# Patient Record
Sex: FEMALE | Race: WHITE | URBAN - METROPOLITAN AREA
[De-identification: names, ages, dates, MRNs, and addresses within clinical notes are randomized per-mention and may not be internally consistent; named-entity substitution may affect disease eponyms.]

---

## 2017-12-04 ENCOUNTER — DOCTOR'S OFFICE (OUTPATIENT)
Dept: URBAN - METROPOLITAN AREA CLINIC 136 | Facility: CLINIC | Age: 67
Setting detail: OPHTHALMOLOGY
End: 2017-12-04

## 2017-12-04 DIAGNOSIS — H52.4: ICD-10-CM

## 2017-12-04 DIAGNOSIS — H52.223: ICD-10-CM

## 2017-12-04 DIAGNOSIS — H52.03: ICD-10-CM

## 2017-12-04 PROCEDURE — SELFPAYVIS VISION VISIT SELF PAY: Performed by: OPTOMETRIST

## 2017-12-04 ASSESSMENT — REFRACTION_AUTOREFRACTION
OD_SPHERE: +0.50
OS_SPHERE: +0.50
OS_CYLINDER: -1.00
OD_CYLINDER: -0.50
OD_AXIS: 015
OS_AXIS: 163

## 2017-12-04 ASSESSMENT — REFRACTION_CURRENTRX
OD_OVR_VA: 20/
OD_OVR_VA: 20/
OS_OVR_VA: 20/
OD_OVR_VA: 20/

## 2017-12-04 ASSESSMENT — REFRACTION_MANIFEST
OS_VA2: 20/
OS_VA2: 20/
OD_VA1: 20/
OS_VA3: 20/
OD_VA2: 20/
OU_VA: 20/
OD_VA1: 20/
OS_VA1: 20/
OS_VA1: 20/
OD_VA3: 20/
OD_VA2: 20/
OU_VA: 20/
OS_VA3: 20/
OD_VA3: 20/

## 2017-12-04 ASSESSMENT — VISUAL ACUITY
OD_BCVA: 20/60
OS_BCVA: 20/25+3

## 2017-12-04 ASSESSMENT — REFRACTION_OUTSIDERX
OD_VA2: 20/20
OS_VA1: 20/20
OS_CYLINDER: -0.50
OD_AXIS: 015
OD_VA3: 20/
OU_VA: 20/20
OD_SPHERE: +0.50
OD_CYLINDER: -0.50
OD_VA1: 20/20
OD_ADD: +2.50
OS_VA3: 20/
OS_ADD: +2.50
OS_AXIS: 165
OS_SPHERE: +0.50
OS_VA2: 20/20

## 2017-12-04 ASSESSMENT — SPHEQUIV_DERIVED
OS_SPHEQUIV: 0
OD_SPHEQUIV: 0.25

## 2017-12-04 ASSESSMENT — CONFRONTATIONAL VISUAL FIELD TEST (CVF)
OD_FINDINGS: FULL
OS_FINDINGS: FULL

## 2018-01-02 PROBLEM — H25.13 NUCLEAR CATARACT OU: Status: ACTIVE | Noted: 2017-12-04

## 2018-01-02 PROBLEM — H52.03 HYPEROPIA, ASTIGMATISM, PRESBYOPIA OU: Status: ACTIVE | Noted: 2017-12-04

## 2018-01-02 PROBLEM — H52.4 HYPEROPIA, ASTIGMATISM, PRESBYOPIA OU: Status: ACTIVE | Noted: 2017-12-04

## 2018-01-02 PROBLEM — H52.223 HYPEROPIA, ASTIGMATISM, PRESBYOPIA OU: Status: ACTIVE | Noted: 2017-12-04

## 2022-01-23 ENCOUNTER — HOSPITAL ENCOUNTER (EMERGENCY)
Facility: HOSPITAL | Age: 72
DRG: 897 | End: 2022-01-24
Attending: EMERGENCY MEDICINE
Payer: MEDICARE

## 2022-01-23 DIAGNOSIS — F10.10 ALCOHOL ABUSE: Primary | ICD-10-CM

## 2022-01-23 LAB
AMPHETAMINES SERPL QL SCN: NEGATIVE
ANION GAP SERPL CALCULATED.3IONS-SCNC: 12 MMOL/L (ref 4–13)
BARBITURATES UR QL: NEGATIVE
BASOPHILS # BLD AUTO: 0.05 THOUSANDS/ΜL (ref 0–0.1)
BASOPHILS NFR BLD AUTO: 1 % (ref 0–1)
BENZODIAZ UR QL: NEGATIVE
BUN SERPL-MCNC: 7 MG/DL (ref 5–25)
CALCIUM SERPL-MCNC: 9.5 MG/DL (ref 8.3–10.1)
CHLORIDE SERPL-SCNC: 101 MMOL/L (ref 100–108)
CO2 SERPL-SCNC: 23 MMOL/L (ref 21–32)
COCAINE UR QL: NEGATIVE
CREAT SERPL-MCNC: 0.98 MG/DL (ref 0.6–1.3)
EOSINOPHIL # BLD AUTO: 0.1 THOUSAND/ΜL (ref 0–0.61)
EOSINOPHIL NFR BLD AUTO: 1 % (ref 0–6)
ERYTHROCYTE [DISTWIDTH] IN BLOOD BY AUTOMATED COUNT: 14 % (ref 11.6–15.1)
ETHANOL EXG-MCNC: 0.11 MG/DL
FLUAV RNA RESP QL NAA+PROBE: NEGATIVE
FLUBV RNA RESP QL NAA+PROBE: NEGATIVE
GFR SERPL CREATININE-BSD FRML MDRD: 59 ML/MIN/1.73SQ M
GLUCOSE SERPL-MCNC: 93 MG/DL (ref 65–140)
HCT VFR BLD AUTO: 41.3 % (ref 34.8–46.1)
HGB BLD-MCNC: 14.3 G/DL (ref 11.5–15.4)
IMM GRANULOCYTES # BLD AUTO: 0.03 THOUSAND/UL (ref 0–0.2)
IMM GRANULOCYTES NFR BLD AUTO: 0 % (ref 0–2)
LYMPHOCYTES # BLD AUTO: 2.77 THOUSANDS/ΜL (ref 0.6–4.47)
LYMPHOCYTES NFR BLD AUTO: 29 % (ref 14–44)
MCH RBC QN AUTO: 38.3 PG (ref 26.8–34.3)
MCHC RBC AUTO-ENTMCNC: 34.6 G/DL (ref 31.4–37.4)
MCV RBC AUTO: 111 FL (ref 82–98)
METHADONE UR QL: NEGATIVE
MONOCYTES # BLD AUTO: 0.66 THOUSAND/ΜL (ref 0.17–1.22)
MONOCYTES NFR BLD AUTO: 7 % (ref 4–12)
NEUTROPHILS # BLD AUTO: 6.09 THOUSANDS/ΜL (ref 1.85–7.62)
NEUTS SEG NFR BLD AUTO: 62 % (ref 43–75)
NRBC BLD AUTO-RTO: 0 /100 WBCS
OPIATES UR QL SCN: NEGATIVE
OXYCODONE+OXYMORPHONE UR QL SCN: NEGATIVE
PCP UR QL: NEGATIVE
PLATELET # BLD AUTO: 214 THOUSANDS/UL (ref 149–390)
PMV BLD AUTO: 9.5 FL (ref 8.9–12.7)
POTASSIUM SERPL-SCNC: 3.6 MMOL/L (ref 3.5–5.3)
RBC # BLD AUTO: 3.73 MILLION/UL (ref 3.81–5.12)
RSV RNA RESP QL NAA+PROBE: NEGATIVE
SARS-COV-2 RNA RESP QL NAA+PROBE: NEGATIVE
SODIUM SERPL-SCNC: 136 MMOL/L (ref 136–145)
THC UR QL: NEGATIVE
WBC # BLD AUTO: 9.7 THOUSAND/UL (ref 4.31–10.16)

## 2022-01-23 PROCEDURE — 82075 ASSAY OF BREATH ETHANOL: CPT | Performed by: EMERGENCY MEDICINE

## 2022-01-23 PROCEDURE — 80048 BASIC METABOLIC PNL TOTAL CA: CPT | Performed by: EMERGENCY MEDICINE

## 2022-01-23 PROCEDURE — 36415 COLL VENOUS BLD VENIPUNCTURE: CPT | Performed by: EMERGENCY MEDICINE

## 2022-01-23 PROCEDURE — 80307 DRUG TEST PRSMV CHEM ANLYZR: CPT | Performed by: EMERGENCY MEDICINE

## 2022-01-23 PROCEDURE — 93005 ELECTROCARDIOGRAM TRACING: CPT

## 2022-01-23 PROCEDURE — 85025 COMPLETE CBC W/AUTO DIFF WBC: CPT | Performed by: EMERGENCY MEDICINE

## 2022-01-23 PROCEDURE — 99284 EMERGENCY DEPT VISIT MOD MDM: CPT

## 2022-01-23 PROCEDURE — 0241U HB NFCT DS VIR RESP RNA 4 TRGT: CPT | Performed by: EMERGENCY MEDICINE

## 2022-01-23 PROCEDURE — 99285 EMERGENCY DEPT VISIT HI MDM: CPT | Performed by: EMERGENCY MEDICINE

## 2022-01-23 NOTE — ED PROVIDER NOTES
History  Chief Complaint   Patient presents with    Detox Evaluation     Pt brought in by EMS after family intervention  Pt reports drinking about a half a box of wine daily  Pt reports being a drinker for a very long time over last 2 years has gotten progressively worse  Pt also reports over last 3 weeks no longer taking supplements and showering  Pt reports she feels like she needs help  No SI or HI     Pt is a 71yo F who presents for alcohol detox  Pt states that she has been drinking 1 5L of wine daily for the past 3 years  She states she does drink every day and her last drink was earlier today  Pt denies any history of alcohol withdrawal seizures but does states she gets symptoms of withdrawal, primarily in the AM  Pt states she often has nausea and tremors in the morning that she mitigates with alcohol  Pt states her family had an intervention with her today and she is now here to get help  Pt reporting that she feels her alcohol use has affected her mental health and that she is no longer able to manage her alcohol at home  Pt does smoke 1 pack per day  Pt denies any other substance use  Pt denies any daily medications  Pt reports that she previously took daily supplements but has not taken them for several years  None       History reviewed  No pertinent past medical history  History reviewed  No pertinent surgical history  History reviewed  No pertinent family history  I have reviewed and agree with the history as documented  E-Cigarette/Vaping    E-Cigarette Use Never User      E-Cigarette/Vaping Substances     Social History     Tobacco Use    Smoking status: Current Every Day Smoker     Packs/day: 1 00    Smokeless tobacco: Never Used   Vaping Use    Vaping Use: Never used   Substance Use Topics    Alcohol use: Yes     Comment: half a box of wine daily    Drug use: Never        Review of Systems   12-point ROS negative      Physical Exam  ED Triage Vitals [01/23/22 1856] Temperature Pulse Respirations Blood Pressure SpO2   97 8 °F (36 6 °C) 103 18 106/66 97 %      Temp Source Heart Rate Source Patient Position - Orthostatic VS BP Location FiO2 (%)   Oral Monitor Lying Left arm --      Pain Score       No Pain             Orthostatic Vital Signs  Vitals:    01/24/22 0131 01/24/22 0210 01/24/22 0857 01/24/22 0859   BP: 134/72 119/68 144/76 144/76   Pulse: 96 (!) 111 (!) 119 (!) 119   Patient Position - Orthostatic VS: Lying  Lying        Physical Exam  Vitals reviewed  Constitutional:       General: She is not in acute distress  Appearance: She is well-developed  She is not toxic-appearing or diaphoretic  HENT:      Head: Normocephalic and atraumatic  Right Ear: External ear normal       Left Ear: External ear normal       Nose: Nose normal       Mouth/Throat:      Pharynx: Oropharynx is clear  Eyes:      Extraocular Movements: Extraocular movements intact  Pupils: Pupils are equal, round, and reactive to light  Cardiovascular:      Rate and Rhythm: Regular rhythm  Tachycardia present  Heart sounds: Normal heart sounds  No murmur heard  Pulmonary:      Effort: Pulmonary effort is normal  No respiratory distress  Breath sounds: Normal breath sounds  Abdominal:      General: There is no distension  Palpations: Abdomen is soft  Tenderness: There is no abdominal tenderness  Musculoskeletal:         General: Normal range of motion  Cervical back: Normal range of motion  Right lower leg: No edema  Left lower leg: No edema  Skin:     General: Skin is warm and dry  Capillary Refill: Capillary refill takes less than 2 seconds  Coloration: Skin is not pale  Findings: No erythema or rash  Neurological:      Mental Status: She is alert and oriented to person, place, and time  Psychiatric:         Mood and Affect: Mood is anxious  Speech: Speech normal          Behavior: Behavior is cooperative  Thought Content: Thought content does not include homicidal or suicidal ideation  ED Medications  Medications - No data to display    Diagnostic Studies  Results Reviewed     Procedure Component Value Units Date/Time    Rapid drug screen, urine [[id number]]  (Normal) Collected: 01/23/22 2207    Lab Status: Final result Specimen: Urine, Clean Catch Updated: 01/23/22 2243     Amph/Meth UR Negative     Barbiturate Ur Negative     Benzodiazepine Urine Negative     Cocaine Urine Negative     Methadone Urine Negative     Opiate Urine Negative     PCP Ur Negative     THC Urine Negative     Oxycodone Urine Negative    Narrative:      FOR MEDICAL PURPOSES ONLY  IF CONFIRMATION NEEDED PLEASE CONTACT THE LAB WITHIN 5 DAYS  Drug Screen Cutoff Levels:  AMPHETAMINE/METHAMPHETAMINES  1000 ng/mL  BARBITURATES     200 ng/mL  BENZODIAZEPINES     200 ng/mL  COCAINE      300 ng/mL  METHADONE      300 ng/mL  OPIATES      300 ng/mL  PHENCYCLIDINE     25 ng/mL  THC       50 ng/mL  OXYCODONE      100 ng/mL    COVID/FLU/RSV [[id number]]  (Normal) Collected: 01/23/22 1951    Lab Status: Final result Specimen: Nares from Nasopharyngeal Swab Updated: 01/23/22 2046     SARS-CoV-2 Negative     INFLUENZA A PCR Negative     INFLUENZA B PCR Negative     RSV PCR Negative    Narrative:      FOR PEDIATRIC PATIENTS - copy/paste COVID Guidelines URL to browser: [email or web address]  [employer]    SARS-CoV-2 assay is a Nucleic Acid Amplification assay intended for the  qualitative detection of nucleic acid from SARS-CoV-2 in nasopharyngeal  swabs  Results are for the presumptive identification of SARS-CoV-2 RNA  Positive results are indicative of infection with SARS-CoV-2, the virus  causing COVID-19, but do not rule out bacterial infection or co-infection  with other viruses   Laboratories within the [place] and its  territories are required to report all positive results to the appropriate  public health authorities  Negative results do not preclude SARS-CoV-2  infection and should not be used as the sole basis for treatment or other  patient management decisions  Negative results must be combined with  clinical observations, patient history, and epidemiological information  This test has not been FDA cleared or approved  This test has been authorized by FDA under an Emergency Use Authorization  (EUA)  This test is only authorized for the duration of time the  declaration that circumstances exist justifying the authorization of the  emergency use of an in vitro diagnostic tests for detection of SARS-CoV-2  virus and/or diagnosis of COVID-19 infection under section 564(b)(1) of  the Act, 21 U  S C  505DMY-1(E)(2), unless the authorization is terminated  or revoked sooner  The test has been validated but independent review by FDA  and CLIA is pending  Test performed using [employer] GeneXpert: This RT-PCR assay targets N2,  a region unique to SARS-CoV-2  A conserved region in the E-gene was chosen  for pan-Sarbecovirus detection which includes SARS-CoV-2      Basic metabolic panel [[id number]] Collected: 01/23/22 1951    Lab Status: Final result Specimen: Blood from Arm, Left Updated: 01/23/22 2015     Sodium 136 mmol/L      Potassium 3 6 mmol/L      Chloride 101 mmol/L      CO2 23 mmol/L      ANION GAP 12 mmol/L      BUN 7 mg/dL      Creatinine 0 98 mg/dL      Glucose 93 mg/dL      Calcium 9 5 mg/dL      eGFR 59 ml/min/1 73sq m     Narrative:      Jami guidelines for Chronic Kidney Disease (CKD):     Stage 1 with normal or high GFR (GFR > 90 mL/min/1 73 square meters)    Stage 2 Mild CKD (GFR = 60-89 mL/min/1 73 square meters)    Stage 3A Moderate CKD (GFR = 45-59 mL/min/1 73 square meters)    Stage 3B Moderate CKD (GFR = 30-44 mL/min/1 73 square meters)    Stage 4 Severe CKD (GFR = 15-29 mL/min/1 73 square meters)    Stage 5 End Stage CKD (GFR <15 mL/min/1 73 square meters)  Note: GFR calculation is accurate only with a steady state creatinine    CBC and differential [[id number]]  (Abnormal) Collected: 01/23/22 1951    Lab Status: Final result Specimen: Blood from Arm, Left Updated: 01/23/22 2002     WBC 9 70 Thousand/uL      RBC 3 73 Million/uL      Hemoglobin 14 3 g/dL      Hematocrit 41 3 %       fL      MCH 38 3 pg      MCHC 34 6 g/dL      RDW 14 0 %      MPV 9 5 fL      Platelets 174 Thousands/uL      nRBC 0 /100 WBCs      Neutrophils Relative 62 %      Immat GRANS % 0 %      Lymphocytes Relative 29 %      Monocytes Relative 7 %      Eosinophils Relative 1 %      Basophils Relative 1 %      Neutrophils Absolute 6 09 Thousands/µL      Immature Grans Absolute 0 03 Thousand/uL      Lymphocytes Absolute 2 77 Thousands/µL      Monocytes Absolute 0 66 Thousand/µL      Eosinophils Absolute 0 10 Thousand/µL      Basophils Absolute 0 05 Thousands/µL     POCT alcohol breath test [[id number]]  (Normal) Resulted: 01/23/22 1951    Lab Status: Final result Updated: 01/23/22 1951     EXTBreath Alcohol 0 107                 No orders to display         Procedures  Procedures      ED Course  ED Course as of 01/24/22 2255   [facility] Press Jan 23, 2022   1930 Unable to accept at [facility] detox center at this time 2/2 staffing  Will plan for HOST referral   may be able to accept in AM if pt still awaiting placement  1951 EXTBreath Alcohol: 0 107   2002 CBC and differential(!)  Reviewed and without actionable derangement  6354 Basic metabolic panel  Reviewed and without actionable derangement  Identification of Seniors at Risk      Most Recent Value   (ISAR) Identification of Seniors at Risk    Before the illness or injury that brought you to the Emergency, did you need someone to help you on a regular basis?  0 Filed at: 01/23/2022 1905   In the last 24 hours, have you needed more help than usual? 0 Filed at: 01/23/2022 7302   Have you been hospitalized for one or more nights during the past 6 months? 0 Filed at: 01/23/2022 1905   In general, do you see well? 0 Filed at: 01/23/2022 1905   In general, do you have serious problems with your memory? 0 Filed at: 01/23/2022 1905   Do you take more than three different medications every day? 0 Filed at: 01/23/2022 1905   ISAR Score 0 Filed at: 01/23/2022 1905                    SBIRT 22yo+      Most Recent Value   SBIRT (23 yo +)    In order to provide better care to our patients, we are screening all of our patients for alcohol and drug use  Would it be okay to ask you these screening questions? Yes Filed at: 01/23/2022 6828   Initial Alcohol Screen: US AUDIT-C     1  How often do you have a drink containing alcohol? 6 Filed at: 01/23/2022 1938   2  How many drinks containing alcohol do you have on a typical day you are drinking? 5 Filed at: 01/23/2022 1938   3b  FEMALE Any Age, or MALE 65+: How often do you have 4 or more drinks on one occassion? 6 Filed at: 01/23/2022 1938   Audit-C Score 17 Filed at: 01/23/2022 1938   Full Alcohol Screen: US AUDIT    4  How often during the last year have you found that you were not able to stop drinking once you had started? 4 Filed at: 01/23/2022 1938   5  How often during past year have you failed to do what was normally expected of you because of drinking? 4 Filed at: 01/23/2022 1938   6  How often in past year have you needed a first drink in the morning to get yourself going after a heavy drinking session? 4 Filed at: 01/23/2022 1938   7  How often in past year have you had feeling of guilt or remorse after drinking? 4 Filed at: 01/23/2022 1938   8  How often in past year have you been unable to remember what happened night before because you had been drinking? 1 Filed at: 01/23/2022 1938   9  Have you or someone else been injured as a result of your drinking? 0 Filed at: 01/23/2022 1938   10   Has a relative, friend, doctor or other health worker been concerned about your drinking and suggested you cut down? 4 Filed at: 01/23/2022 1938   AUDIT Total Score 38 Filed at: 01/23/2022 1938   VLADIMIR: How many times in the past year have you    Used an illegal drug or used a prescription medication for non-medical reasons? Never Filed at: 01/23/2022 1938                [facility]  Number of Diagnoses or Management Options  Alcohol abuse  Diagnosis management comments: Pt is a 73yo F who presents for alcohol detox  Will plan for medical clearance labs and placement  Discussed with [address] [place] [address], however, unable to admit overnight  Will plan for HOST referral and attempt to contact [address] [place] [address] again in AM if pt not yet placed  Pt and  updated on plan and agreeable  Medical clearance labs show ETOH on board but no other significant findings  Still awaiting placement at time of sign out  Pt signed out to oncoming physician with plan for transfer to in detox once placement found  Amount and/or Complexity of Data Reviewed  Clinical lab tests: ordered and reviewed  Discussion of test results with the performing providers: yes        Disposition  Final diagnoses:   Alcohol abuse     Time reflects when diagnosis was documented in both MDM as applicable and the Disposition within this note     Time User Action Codes Description Comment    1/24/2022  7:58 AM Atif President Add [F10 10] Alcohol abuse       ED Disposition     ED Disposition Condition Date/Time Comment    Transfer to Another Facility-In Network  Mon Jan 24, 2022  7:58 AM Hong Fuel should be transferred out to [facility]          MD Documentation      Most Recent Value   Patient Condition The patient has been stabilized such that within reasonable medical probability, no material deterioration of the patient condition or the condition of the unborn child(garth) is likely to result from the transfer   Reason for Transfer Level of Care needed not available at this facility   Benefits of Transfer Continuity of care, Specialized equipment and/or services available at the receiving facility (Include comment)________________________   Risks of Transfer Potential for delay in receiving treatment   Accepting Physician Dr Hilario Duane Name, Nicole Dunne      RN Documentation      Most [facility] Name, [facility]   Report Given to Russell Puri, [address] [place] [address]      Follow-up Information    None         There are no discharge medications for this patient  No discharge procedures on file  PDMP Review     None           ED Provider  Attending physically available and evaluated Kamilajay jay Easton  MARTY managed the patient along with the ED Attending      Electronically Signed by         Sylvie Palomino MD  01/24/22 7643

## 2022-01-24 ENCOUNTER — HOSPITAL ENCOUNTER (INPATIENT)
Facility: HOSPITAL | Age: 72
LOS: 2 days | Discharge: OTHER INSTITUTION | DRG: 897 | End: 2022-01-26
Attending: EMERGENCY MEDICINE | Admitting: EMERGENCY MEDICINE
Payer: MEDICARE

## 2022-01-24 VITALS
DIASTOLIC BLOOD PRESSURE: 76 MMHG | WEIGHT: 139 LBS | OXYGEN SATURATION: 97 % | HEART RATE: 119 BPM | SYSTOLIC BLOOD PRESSURE: 144 MMHG | RESPIRATION RATE: 20 BRPM | TEMPERATURE: 97.8 F

## 2022-01-24 DIAGNOSIS — D75.89 MACROCYTOSIS WITHOUT ANEMIA: ICD-10-CM

## 2022-01-24 DIAGNOSIS — F10.20 ALCOHOL USE DISORDER, MODERATE, DEPENDENCE (HCC): Primary | ICD-10-CM

## 2022-01-24 PROBLEM — F10.239 ALCOHOL WITHDRAWAL SYNDROME WITH COMPLICATION, WITH UNSPECIFIED COMPLICATION (HCC): Status: ACTIVE | Noted: 2022-01-24

## 2022-01-24 PROBLEM — M17.0 OSTEOARTHRITIS OF BOTH KNEES: Status: ACTIVE | Noted: 2022-01-24

## 2022-01-24 PROBLEM — F10.939 ALCOHOL WITHDRAWAL SYNDROME WITH COMPLICATION, WITH UNSPECIFIED COMPLICATION (HCC): Status: ACTIVE | Noted: 2022-01-24

## 2022-01-24 LAB
ATRIAL RATE: 79 BPM
P AXIS: 72 DEGREES
PR INTERVAL: 210 MS
QRS AXIS: 4 DEGREES
QRSD INTERVAL: 74 MS
QT INTERVAL: 390 MS
QTC INTERVAL: 447 MS
T WAVE AXIS: 76 DEGREES
VENTRICULAR RATE: 79 BPM

## 2022-01-24 PROCEDURE — 1124F ACP DISCUSS-NO DSCNMKR DOCD: CPT | Performed by: PHYSICIAN ASSISTANT

## 2022-01-24 PROCEDURE — 93010 ELECTROCARDIOGRAM REPORT: CPT | Performed by: INTERNAL MEDICINE

## 2022-01-24 PROCEDURE — 99222 1ST HOSP IP/OBS MODERATE 55: CPT

## 2022-01-24 PROCEDURE — HZ2ZZZZ DETOXIFICATION SERVICES FOR SUBSTANCE ABUSE TREATMENT: ICD-10-PCS | Performed by: EMERGENCY MEDICINE

## 2022-01-24 RX ORDER — LANOLIN ALCOHOL/MO/W.PET/CERES
100 CREAM (GRAM) TOPICAL DAILY
Status: DISCONTINUED | OUTPATIENT
Start: 2022-01-25 | End: 2022-01-26 | Stop reason: HOSPADM

## 2022-01-24 RX ORDER — ACETAMINOPHEN 325 MG/1
650 TABLET ORAL EVERY 6 HOURS PRN
Status: DISCONTINUED | OUTPATIENT
Start: 2022-01-24 | End: 2022-01-26 | Stop reason: HOSPADM

## 2022-01-24 RX ORDER — DOCUSATE SODIUM 100 MG/1
100 CAPSULE, LIQUID FILLED ORAL 2 TIMES DAILY PRN
Status: DISCONTINUED | OUTPATIENT
Start: 2022-01-24 | End: 2022-01-26 | Stop reason: HOSPADM

## 2022-01-24 RX ORDER — ONDANSETRON 2 MG/ML
4 INJECTION INTRAMUSCULAR; INTRAVENOUS EVERY 6 HOURS PRN
Status: DISCONTINUED | OUTPATIENT
Start: 2022-01-24 | End: 2022-01-26 | Stop reason: HOSPADM

## 2022-01-24 RX ORDER — TRAZODONE HYDROCHLORIDE 50 MG/1
50 TABLET ORAL
Status: DISCONTINUED | OUTPATIENT
Start: 2022-01-24 | End: 2022-01-24

## 2022-01-24 RX ORDER — LANOLIN ALCOHOL/MO/W.PET/CERES
100 CREAM (GRAM) TOPICAL DAILY
Status: DISCONTINUED | OUTPATIENT
Start: 2022-01-24 | End: 2022-01-24

## 2022-01-24 RX ORDER — SODIUM CHLORIDE 9 MG/ML
100 INJECTION, SOLUTION INTRAVENOUS CONTINUOUS
Status: DISCONTINUED | OUTPATIENT
Start: 2022-01-24 | End: 2022-01-24

## 2022-01-24 RX ORDER — FOLIC ACID 1 MG/1
1 TABLET ORAL DAILY
Status: DISCONTINUED | OUTPATIENT
Start: 2022-01-24 | End: 2022-01-24

## 2022-01-24 RX ORDER — NICOTINE 21 MG/24HR
21 PATCH, TRANSDERMAL 24 HOURS TRANSDERMAL DAILY
Status: DISCONTINUED | OUTPATIENT
Start: 2022-01-24 | End: 2022-01-26 | Stop reason: HOSPADM

## 2022-01-24 RX ORDER — LANOLIN ALCOHOL/MO/W.PET/CERES
3 CREAM (GRAM) TOPICAL
Status: DISCONTINUED | OUTPATIENT
Start: 2022-01-24 | End: 2022-01-26 | Stop reason: HOSPADM

## 2022-01-24 RX ORDER — FOLIC ACID 1 MG/1
1 TABLET ORAL DAILY
Status: DISCONTINUED | OUTPATIENT
Start: 2022-01-25 | End: 2022-01-26 | Stop reason: HOSPADM

## 2022-01-24 RX ADMIN — ENOXAPARIN SODIUM 40 MG: 40 INJECTION SUBCUTANEOUS at 11:06

## 2022-01-24 RX ADMIN — MULTIPLE VITAMINS W/ MINERALS TAB 1 TABLET: TAB ORAL at 11:06

## 2022-01-24 RX ADMIN — FOLIC ACID: 5 INJECTION, SOLUTION INTRAMUSCULAR; INTRAVENOUS; SUBCUTANEOUS at 12:18

## 2022-01-24 RX ADMIN — SODIUM CHLORIDE 100 ML/HR: 0.9 INJECTION, SOLUTION INTRAVENOUS at 11:06

## 2022-01-24 NOTE — PROGRESS NOTES
01/24/22 1312   Referral Data   Referral Name self   Referral Reason Drug/Alcohol [address] [facility]   Readmission Root Cause   30 Day Readmission No   Patient Information   Mental Status Alert   Primary Caregiver Self   Support System Immediate family   Legal Information   Legal Issues pt denies   Health Care Proxy Appointed No   Activities of Daily Living Prior to Admission   Functional Status Independent   Assistive Device No device needed   Living Arrangement House   Ambulation Independent   Access to Firearms   Access to Firearms Yes   Describe Access to Weapons pt reports her and her  own firearms   Is there someone that can secure the firearms? Yes -  will be notified and asked to [address] SSI/SSD   Means of Transportation   Means of Transport to Appts: Drives Self     Pt is a 73year old  female who was admitted to the detox for alcohol withdrawal  Pt had presented at the [facility] ED via ambulance after her family conducted an intervention regarding her drinking and pt was agreeable to detox  Pt's name, date of birth, home address, and telephone number were verified  Pt was informed of case management role and the purpose of the completion of intake with case management  Pt presented as cooperative and answered all questions  Pt reports she had been drinking a box to 1/2 box of wine daily for the last two years  Pt reports her last drink was 1/23/2022 of 20 oz of wine and her first use of alcohol was 20  Pt reports nicotine use of 1 PPD of cigarettes  Pt reports of period of abstinence of 4 months at one time achieved by dieting and not wanting to increase her calories  Pt denied any previous substance use treatment or detox  Pt denied any current withdrawal symptoms but reported common were tremor, dry heaves, and abdominal pain   Pt reports a family history of substance abuse- siblings  AUDIT: 38  PAWSS: 4  Ethanol lvl: <10  UDS: negative for all    Pt reports a self diagnosis of anxiety and depression but denied any formal treatment  Pt denied any SI or HI, denied AH/VH  Pt denied any history of abuse  Pt reports access to legally owned firearms in the home and pt's  Alejandra Mcmullen was contacted and asked to secure these weapons  Pt denied any family history of mental health needs  Pt denied any current medical conditions  Pt denied having a PCP at this time  Pt has current medical insurance of medicare and an AARP supplemental plan  Pt has preferred pharmacy of [facility]  Pt signed her admission and discharge IMM  Pt denied any current legal issues  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  Pt reports income of [employer]  Pt states she has a car and license  Pt denied any  service  Pt reports she resides with her  in their home  Pt signed an LUIS E for Flash Bhat, , and he was contacted at [phone]  Kathy Cedeno states he is in support of pt entering rehab and states the family has been researching possible rehabs  Cm explained the referral process for HOST and informed Kathy Cedeno that cm would contact Kathy Cedeno with updated information as needed  Pt and Cm completed relapse prevention plan  Pt and Cm signed plan and pt received a copy of this plan  Pt states she is interested in inpatient treatment at this time  Pt presented as motivated and states she is also willing to begin AA programs after inpatient treatment  Pt signed an LUIS E for HOST program due to pt's medicare status and cm made a referral to HOST  Pt presents as motivated for recovery and presents as ready to begin the process  Pt's goals for treatment will be to successfully complete medical withdrawal and to transfer to inpatient rehab  Pt would benefit from developing an AA peer group who will provide pt with education regarding recovery and relapse   Pt presents in the contemplation stage of change

## 2022-01-24 NOTE — EMTALA/ACUTE CARE TRANSFER
[facility] EMERGENCY DEPARTMENT  [address] [place] [address]  Dept: [phone]      KXCELP TRANSFER CONSENT    NAME Brii Berry                                         1950                              MRN [id number]    I have been informed of my rights regarding examination, treatment, and transfer   by Dr Carl Fuller MD    Benefits: Continuity of care,Specialized equipment and/or services available at the receiving facility (Include comment)________________________    Risks: Potential for delay in receiving treatment      Consent for Transfer:  I acknowledge that my medical condition has been evaluated and explained to me by the emergency department physician or other qualified medical person and/or my attending physician, who has recommended that I be transferred to the service of  Accepting Physician: Dr Wilbert Valencia at [facility] Name, [address] : [place]  The above potential benefits of such transfer, the potential risks associated with such transfer, and the probable risks of not being transferred have been explained to me, and I fully understand them  The doctor has explained that, in my case, the benefits of transfer outweigh the risks  I agree to be transferred  I authorize the performance of emergency medical procedures and treatments upon me in both transit and upon arrival at the receiving facility  Additionally, I authorize the release of any and all medical records to the receiving facility and request they be transported with me, if possible  I understand that the safest mode of transportation during a medical emergency is an ambulance and that the Hospital advocates the use of this mode of transport  Risks of traveling to the receiving facility by car, including absence of medical control, life sustaining equipment, such as oxygen, and medical personnel has been explained to me and I fully understand them      ([address])  [ ]  I consent to the stated transfer and to be transported by ambulance/helicopter  [  ]  I consent to the stated transfer, but refuse transportation by ambulance and accept full responsibility for my transportation by car  I understand the risks of non-ambulance transfers and I exonerate the Hospital and its staff from any deterioration in my condition that results from this refusal     X___________________________________________    DATE  22  TIME________  Signature of patient or legally responsible individual signing on patient behalf           RELATIONSHIP TO PATIENT_________________________          Provider Certification    NAME Jenny Jacobo                                         1950                              MRN [id number]    A medical screening exam was performed on the above named patient  Based on the examination:    Condition Necessitating Transfer The encounter diagnosis was Alcohol abuse  Patient Condition: The patient has been stabilized such that within reasonable medical probability, no material deterioration of the patient condition or the condition of the unborn child(garth) is likely to result from the transfer    Reason for Transfer: Level of Care needed not available at this facility    Transfer Requirements: [address]   · Space available and qualified personnel available for treatment as acknowledged by    · Agreed to accept transfer and to provide appropriate medical treatment as acknowledged by       Dr Amy Munguia  · Appropriate medical records of the examination and treatment of the patient are provided at the time of transfer   [facility], [address] _______  · Transfer will be performed by qualified personnel from    and appropriate transfer equipment as required, including the use of necessary and appropriate life support measures      Provider Certification: I have examined the patient and explained the following risks and benefits of being transferred/refusing transfer to the patient/family:         Based on these reasonable risks and benefits to the patient and/or the unborn child(garth), and based upon the information available at the time of the patients examination, I certify that the medical benefits reasonably to be expected from the provision of appropriate medical treatments at another medical facility outweigh the increasing risks, if any, to the individuals medical condition, and in the case of labor to the unborn child, from effecting the transfer      X____________________________________________ DATE 01/24/22        TIME_______      ORIGINAL - SEND TO MEDICAL RECORDS   COPY - SEND WITH PATIENT DURING TRANSFER

## 2022-01-24 NOTE — H&P
HISTORY & PHYSICAL EXAM  DEPARTMENT OF MEDICAL TOXICOLOGY  LEVEL 4 MEDICAL DETOX UNIT  Orlin Marrero 73 y o  female MRN: [id number]  Unit/Bed#: [address] [place] [address] [id number] Encounter: [id number]      Reason for Admission/Principal Problem: Ethanol withdrawal, Ethanol use disorder  Admitting Provider: Vasyl Maynard PA-C  Attending Provider: Ranulfo Romero MD   1/24/2022 10:22 AM        * Alcohol withdrawal syndrome with complication, with unspecified complication [facility])  Assessment & Plan  · Patient reports to drinking 1 5L of wine daily  · Last drank:  1/23/22  · Ethanol level upon arrival for ED: 107  · Initiate SEWS protocol  · Withdrawal symptoms will be treated with phenobarbital   · Will start on IV thiamine, folic acid and oral multivitamin supplementation   · Repeat CMP and CBC in AM     Alcohol use disorder, moderate, dependence ([facility] [address] )  Assessment & Plan  · Patient endorses longstanding history of alcohol abuse  · Patient denies any prior inpatient hospitalizations for detox without history of alcohol withdrawal seizures  · Case Management consulted for aftercare planning  · At this time patient wishes for inpatient rehab placement upon discharge     Macrocytosis without anemia  Assessment & Plan  ·    · Will start IV folic acid and thiamine supplementation at this time  Osteoarthritis of both knees  Assessment & Plan  · Tylenol as needed for pain  · Will order voltaren gel prn              VTE Prophylaxis: Enoxaparin (Lovenox)  / sequential compression device   Code Status: Full code per patient  POLST: POLST is not applicable to this patient  Discussion with family: I personally did not discuss this patient with her family    Anticipated Length of Stay:  Patient will be admitted on an Inpatient basis with an anticipated length of stay of  2  midnights     Justification for Hospital Stay: alcohol withdrawal, alcohol use disorder    For any questions or concerns, please Tiger Text the advanced practitioner in the role of [facility]-DETOX-AP On Call      This patient qualifies for Level IV medically managed intensive inpatient services under the criteria set by the American Society of Addiction Medicine, including dimensions 1-3  The patient is in withdrawal (or is intoxicated with high risk of withdrawal), with severe and unstable medical and/or psychiatric (dual diagnosis) problems, requiring requires 24-hour medical and nursing care and the full resources of a [address] patient to medical detox unit and continue supportive care and stabilization of acute ethanol withdrawal per medical toxicology/detox treatment pathway  Monitor ethanol withdrawal severity via the Severity of Ethanol Withdrawal Scale (SEWS) Q4 hours and then hourly if/when SEWS > 6  Treat withdrawal per pathway and reassess Q30-60 minutes  Mild SEWS Score 1-6  Administer medications* (IV or PO; PO preferred):   If initial SEWS score: diazepam 10mg PO/IV x 1 AND phenobarbital 65 mg PO/IV x 1   If repeat SEWS score 1-6: phenobarbital 65 mg PO/IV q1 hour x 5 doses maximum   Reassessment:    SEWS q1 hour after each dose until SEWS 0 x 2 hours   VS q1 hours (until SEWS 0, then q4 hours)   Notify provider for bedside evaluation if 5-dose maximum is reached, RASS of -3 to -5, or SEWS score escalates to moderate or severe  Moderate SEWS Score 7-12  Administer medications* (IV):   If initial SEWS score: diazepam 10mg IV x 1 AND phenobarbital 260 mg IV x 1   If repeat SEWS score 7-12 or score escalated from mild: phenobarbital 130 mg IV q30 minutes x 5 doses maximum   Reassessment:   SEWS q30 minutes after each dose until SEWS < 7 (then hourly until SEWS 0 x 2 hours)   VS q30 minutes until SEWS < 7 (then hourly until SEWS 0, then q4 hours)   Notify provider for bedside evaluation if 5-dose maximum is reached, RASS of -3 to -5, or SEWS score escalates to severe     Severe SEWS Score ? 13  Administer medications* (IV):   If initial SEWS score: Diazepam 10 mg IV x 1 AND phenobarbital 650 mg IV piggyback x 1 over 15-30 minutes   If repeat SEWS score ? 13 or score escalated from mild or moderate: phenobarbital 130 mg IV q30 minutes x 5 doses maximum   Reassessment:   SEWS q30 minutes after each dose until SEWS < 7 (then hourly until SEWS 0 x 2 hours)    VS q30 minutes until SEWS < 7 (then hourly until SEWS 0, then q4 hours)   Notify provider for bedside evaluation if 5-dose maximum is reached or RASS of -3 to -5   *Hold medications and notify provider if CNS depression, respirations < 10/min, or RASS of -3 to -5  Medications to be administered adjunctively if more than 2 grams of phenobarbital is needed for stabilization of withdrawal; require attending approval     Dexmedetomidine infusion 0 1-1mcg/kg/hr IV infusion, titratable to reduced agitation (Goal: RASS -2)   Ketamine   o Acute agitated delirium: 1-2 mg/kg IV or 4-5 mg/kg IM  o Refractory withdrawal: 0 1-1mg/kg/hr IV infusion, titratable to reduced agitation (Goal: RASS -2)    Further evaluation, screening and treatment:  Evaluate complete metabolic panel, transaminases, INR, and lipase  Assess hepatic ultrasound for any sign of alcoholic liver disease or cirrhosis, and ultimately refer for further hepatic evaluation and care as/if indicated  Additional medications for ethanol associated malnutrition: Thiamine 100 mg IV daily, increase to 500 mg TID for signs/symptoms of Wernicke's Encephalopathy or Wernicke Korsakoff Syndrome   Folic acid 1 mg IV daily   Multivitamin PO daily      Will offer first monthly injection of Naltrexone 380 mg IM, once patient is stabilized, as it has been shown to assist in decreasing cravings for ethanol  Evaluate and treat for coexisting substance use, such as opioids and nicotine   Discuss risk factors for infectious disease, such as history of intravenous drug abuse, and offer hepatitis and HIV screening if indicated  Case management consultation to assist with coordination of subsequent treatment after discharge  Hx and PE limited by: none     HPI: Parth Taylor is a 73y o  year old female with unremarkable past medical history, besides osteoarthritis,  alcohol use disorder, with a history breast cancer status post bilateral mastectomy who presents with acute alcohol withdrawal   Patient reports that her drinking has increased since the beginning of the pandemic  She reports drinking around 1 5 L of wine a daily in order to avoid symptoms of withdrawals every morning  No prior history of inpatient detox hospitalizations or history of withdrawal seizures  At the time of admission patient is interested in inpatient rehab upon discharge from detox unit  Preferred alcoholic beverage(s): wine   Quantity and frequency of alcohol intake: 1 5L daily   Use of any ethanol substitutes (toxic alcohols): no  Date/Time of last alcohol intake: 1/23/22 @ 1:30 pm   Current signs and symptoms of ethanol withdrawal: anxiety and tremor    SEWS Total Score: 0 (1/24/2022 10:33 AM)        Ethanol Withdrawal History  Previous ethanol withdrawal? no  Prior inpatient treatment for ethanol withdrawal? no  Prior outpatient treatment for ethanol withdrawal? no  History of seizures with prior ethanol withdrawal? no  Prior treatment with naltrexone (Vivitrol)? no  Current treatment with naltrexone (Vivitrol)? no  Other current treatment for ethanol use disorder? no  Co-existing substance use? no    Review of PDMP: yes    Social History     Substance and Sexual Activity   Alcohol Use Yes    Comment: half a box of wine daily     Social History     Substance and Sexual Activity   Drug Use Never     Social History     Tobacco Use   Smoking Status Current Every Day Smoker    Packs/day: 1 00   Smokeless Tobacco Never Used       Review of Systems   Constitutional: Negative for chills and fever  Cardiovascular: Negative for chest pain  Gastrointestinal: Negative for abdominal pain, diarrhea, nausea and vomiting  Musculoskeletal: Positive for back pain  Neurological: Positive for tremors  Negative for seizures  Psychiatric/Behavioral: The patient is nervous/anxious  Historical Information   History reviewed  No pertinent past medical history  History reviewed  No pertinent surgical history  History reviewed  No pertinent family history    Social History   Marital Status: /Civil Union   Occupation: Retired   Patient Pre-hospital Living Situation: Lives with , Rosaura Bonilla  Patient Pre-hospital Level of Mobility: Independent  Patient Pre-hospital Diet Restrictions: none     No Known Allergies    Prior to Admission medications    Not on File       Current Facility-Administered Medications   Medication Dose Route Frequency    acetaminophen (TYLENOL) tablet 650 mg  650 mg Oral Q6H PRN    docusate sodium (COLACE) capsule 100 mg  100 mg Oral BID PRN    enoxaparin (LOVENOX) subcutaneous injection 40 mg  40 mg Subcutaneous Daily    folic acid 1 mg, thiamine (VITAMIN B1) 100 mg in sodium chloride 0 9 % 100 mL IV piggyback   Intravenous Daily    multivitamin-minerals (CENTRUM) tablet 1 tablet  1 tablet Oral Daily    nicotine (NICODERM CQ) 21 mg/24 hr TD 24 hr patch 21 mg  21 mg Transdermal Daily    ondansetron (ZOFRAN) injection 4 mg  4 mg Intravenous Q6H PRN    sodium chloride 0 9 % infusion  100 mL/hr Intravenous Continuous    traZODone (DESYREL) tablet 50 mg  50 mg Oral HS PRN       Continuous Infusions:sodium chloride, 100 mL/hr, Last Rate: 100 mL/hr (01/24/22 1106)             Objective     No intake or output data in the 24 hours ending 01/24/22 1153    Invasive Devices:   Peripheral IV 01/24/22 Right Forearm (Active)   Site Assessment WDL 01/24/22 0931       Vitals   Vitals:    01/24/22 1024   BP: 118/72   TempSrc: Temporal   Pulse: 105   Resp: 20   Patient Position - Orthostatic VS: Sitting   Temp: 98 3 °F (36 8 °C)       Physical Exam  Constitutional:       Appearance: She is not ill-appearing  Eyes:      Extraocular Movements: Extraocular movements intact  Pupils: Pupils are equal, round, and reactive to light  Cardiovascular:      Rate and Rhythm: Normal rate and regular rhythm  Heart sounds: No murmur heard  Pulmonary:      Breath sounds: Normal breath sounds  No wheezing  Abdominal:      General: There is no distension  Palpations: Abdomen is soft  Tenderness: There is no abdominal tenderness  Skin:     General: Skin is warm and dry  Coloration: Skin is not jaundiced  Neurological:      Mental Status: She is oriented to person, place, and time  Data:    EKG, Pathology, and Other Studies: I have personally reviewed pertinent reports      EKG: Sinus rhythm with 1st degree A-V block  Otherwise normal ECG  No previous ECGs available  Confirmed by Zay Nugent ([id number]) on 1/24/2022 8:41:42 AM    Lab Results:  CBC ETOH     Lab Results   Component Value Date    WBC 9 70 01/23/2022    RBC 3 73 (L) 01/23/2022    HGB 14 3 01/23/2022    HCT 41 3 01/23/2022     (H) 01/23/2022    MCH 38 3 (H) 01/23/2022    MCHC 34 6 01/23/2022    RDW 14 0 01/23/2022     01/23/2022    MPV 9 5 01/23/2022      No results found for: LACTICACID   CMP UA         Component Value Date/Time    K 3 6 01/23/2022 1951     01/23/2022 1951    CO2 23 01/23/2022 1951    BUN 7 01/23/2022 1951    CREATININE 0 98 01/23/2022 1951         Component Value Date/Time    CALCIUM 9 5 01/23/2022 1951      No results found for: CLARITYU, COLORU, SPECGRAV, PHUR, GLUCOSEU, KETONESU, BLOODU, PROTEIN UA, NITRITE, BILIRUBINUR, UROBILINOGEN, LEUKOCYTESUR, WBCUA, RBCUA, HYALINE, BACTERIA, EPIS     Liver Function Test: ASA     No results found for: TBILI, BILIDIR, ALKPHOS, AST, ALT, TP, ALB   No results found for: SALICYLATE   Troponin APAP     No results found for: TROPONINI   No results found for: ACTMNPHEN   VBG HCG     No results found for: PHVEN, CCR0ERR, PO2VEN, RWP8FXT, BEVEN, S9QGHPXAE, I8GPNZP   No results found for: HCGQUANT   ABG Urine Drug Screen     No results found for: PHART, OHT9EUR, PO2ART, LBY4VBF, BEART, T9MVVASFE, O2HGB, SOURC, MEGHNA, VTAC, ACRATE, INSPIREDAIR, PEEP   Lab Results   Component Value Date    AMPMETHUR Negative 01/23/2022    BARBTUR Negative 01/23/2022    BDZUR Negative 01/23/2022    COCAINEUR Negative 01/23/2022    METHADONEUR Negative 01/23/2022    OPIATEUR Negative 01/23/2022    PCPUR Negative 01/23/2022    THCUR Negative 01/23/2022    OXYCODONEUR Negative 01/23/2022      Lactate INR     No results found for: LACTICACID   No results found for: INR   PTT Protime     No results found for: PTT     No results found for: PROTIME           Imaging Studies: I have personally reviewed pertinent reports  Counseling / Coordination of Care  Total floor / unit time spent today 35 minutes  Greater than 50% of total time was spent with the patient and / or family counseling and / or coordination of care  ** Please Note: This note has been constructed using a voice recognition system   **

## 2022-01-24 NOTE — ED ATTENDING ATTESTATION
1/23/2022  IShannen MD, saw and evaluated the patient  I have discussed the patient with the resident/non-physician practitioner and agree with the resident's/non-physician practitioner's findings, Plan of Care, and MDM as documented in the resident's/non-physician practitioner's note, except where noted  All available labs and Radiology studies were reviewed  I was present for key portions of any procedure(s) performed by the resident/non-physician practitioner and I was immediately available to provide assistance  At this point I agree with the current assessment done in the Emergency Department    I have conducted an independent evaluation of this patient a history and physical is as follows:  Pt states that she has always had a problem with alcohol Pt states that for the past 3 years she has drank a box of wine a day Pt gets physical symptoms when she does not drink but she has never had a seizure pt has no physical co Pt suffers from anxiety and depression saw a therapist for a while no si or hi Pt fearful of shower and being unbalanced in shower at this time PE: alert heart reg lungs clear abd soft nontender ext nad MDM; will check labs discuss with tox for inpatient admission   ED Course         Critical Care Time  Procedures

## 2022-01-24 NOTE — ASSESSMENT & PLAN NOTE
· Patient endorses longstanding history of alcohol abuse  · Patient denies any prior inpatient hospitalizations for detox without history of alcohol withdrawal seizures  · Case Management consulted for aftercare planning  · At this time patient wishes for inpatient rehab placement upon discharge

## 2022-01-24 NOTE — DISCHARGE INSTR - OTHER ORDERS
Drug and Alcohol Resources in [facility]    If you have health insurance, including medical assistance, there should be a phone number on your insurance card that you can call to find out how to access services  The card may say, For [address] or For Drug and Alcohol Services or For Substance Abuse Services call the number provided  [facility]  [facility], [facility], [address]   [phone]  A  is available Monday through Friday from 8:00 am to 5:00 pm to provide you with assistance on accessing services for substance abuse  If you do not have health insurance and are in financial need, this office may also help you get the funding for the services that are necessary  [facility] provides funding to support three Recovery Centers in [facility]  These centers offer a safe, sober environment to those in recovery  A variety of programming including 12-Step Meetings, Wilda Silverio, Life Skills Workshops, etc  is offered at each location  [address]  [phone]  www  cleanslatebangor  org [address]  Irene Castrejon, [address] [facility]  [phone]  evusal-wt-qtkn  [address] [facility], [facility]  [phone]   [address] [place] [address]  [phone]  www  [facility], [address] [place] [address]  [phone]  [facility]  Confidential free help, from public health agencies, to find substance use treatment and information  [phone]    Link for Zoom Codes for Virtual 12 step Meetings  Rashel hoyos uk  aspx  Alcoholics Anonymous  [facility]  [phone]    http://www wen com/  Narcotics Anonymous  [phone]  https://Swarm64/

## 2022-01-24 NOTE — ASSESSMENT & PLAN NOTE
· Patient reports to drinking 1 5L of wine daily  · Last drank:  1/23/22 around 1:30pm   · Ethanol level upon arrival for ED: 107  · Initiate SEWS protocol  · Withdrawal symptoms will be treated with phenobarbital   · Will start on IV thiamine, folic acid and oral multivitamin supplementation   · Repeat CMP and CBC in AM

## 2022-01-24 NOTE — ED NOTES
Physician to refer to Elizabet Frazier dextox unit at [facility]  Patient notified

## 2022-01-25 PROBLEM — R74.8 ELEVATED LIVER ENZYMES: Status: ACTIVE | Noted: 2022-01-25

## 2022-01-25 PROBLEM — F10.239 ALCOHOL WITHDRAWAL SYNDROME WITH COMPLICATION, WITH UNSPECIFIED COMPLICATION (HCC): Status: RESOLVED | Noted: 2022-01-24 | Resolved: 2022-01-25

## 2022-01-25 PROBLEM — F10.939 ALCOHOL WITHDRAWAL SYNDROME WITH COMPLICATION, WITH UNSPECIFIED COMPLICATION (HCC): Status: RESOLVED | Noted: 2022-01-24 | Resolved: 2022-01-25

## 2022-01-25 LAB
ALBUMIN SERPL BCP-MCNC: 3.1 G/DL (ref 3–5.2)
ALP SERPL-CCNC: 107 U/L (ref 43–122)
ALT SERPL W P-5'-P-CCNC: 47 U/L
ANION GAP SERPL CALCULATED.3IONS-SCNC: 6 MMOL/L (ref 5–14)
AST SERPL W P-5'-P-CCNC: 82 U/L (ref 14–36)
BILIRUB SERPL-MCNC: 0.9 MG/DL
BUN SERPL-MCNC: 9 MG/DL (ref 5–25)
CALCIUM ALBUM COR SERPL-MCNC: 9.1 MG/DL (ref 8.3–10.1)
CALCIUM SERPL-MCNC: 8.4 MG/DL (ref 8.4–10.2)
CHLORIDE SERPL-SCNC: 103 MMOL/L (ref 97–108)
CO2 SERPL-SCNC: 25 MMOL/L (ref 22–30)
CREAT SERPL-MCNC: 0.71 MG/DL (ref 0.6–1.2)
ERYTHROCYTE [DISTWIDTH] IN BLOOD BY AUTOMATED COUNT: 14.8 %
GFR SERPL CREATININE-BSD FRML MDRD: 87 ML/MIN/1.73SQ M
GLUCOSE SERPL-MCNC: 90 MG/DL (ref 70–99)
HAV IGM SER QL: NORMAL
HBV CORE IGM SER QL: NORMAL
HBV SURFACE AG SER QL: NORMAL
HCT VFR BLD AUTO: 37.8 % (ref 36–46)
HCV AB SER QL: NORMAL
HGB BLD-MCNC: 13.2 G/DL (ref 12–16)
MAGNESIUM SERPL-MCNC: 2 MG/DL (ref 1.6–2.3)
MCH RBC QN AUTO: 39.3 PG (ref 26–34)
MCHC RBC AUTO-ENTMCNC: 34.8 G/DL (ref 31–36)
MCV RBC AUTO: 113 FL (ref 80–100)
PLATELET # BLD AUTO: 181 THOUSANDS/UL (ref 150–450)
PMV BLD AUTO: 8 FL (ref 8.9–12.7)
POTASSIUM SERPL-SCNC: 3.6 MMOL/L (ref 3.6–5)
PROT SERPL-MCNC: 6.2 G/DL (ref 5.9–8.4)
RBC # BLD AUTO: 3.35 MILLION/UL (ref 4–5.2)
SODIUM SERPL-SCNC: 134 MMOL/L (ref 137–147)
WBC # BLD AUTO: 6 THOUSAND/UL (ref 4.5–11)

## 2022-01-25 PROCEDURE — 80074 ACUTE HEPATITIS PANEL: CPT | Performed by: EMERGENCY MEDICINE

## 2022-01-25 PROCEDURE — 85027 COMPLETE CBC AUTOMATED: CPT

## 2022-01-25 PROCEDURE — 80053 COMPREHEN METABOLIC PANEL: CPT

## 2022-01-25 PROCEDURE — 99232 SBSQ HOSP IP/OBS MODERATE 35: CPT

## 2022-01-25 PROCEDURE — 83735 ASSAY OF MAGNESIUM: CPT

## 2022-01-25 RX ADMIN — MULTIPLE VITAMINS W/ MINERALS TAB 1 TABLET: TAB ORAL at 08:32

## 2022-01-25 RX ADMIN — THIAMINE HCL TAB 100 MG 100 MG: 100 TAB at 08:32

## 2022-01-25 RX ADMIN — ENOXAPARIN SODIUM 40 MG: 40 INJECTION SUBCUTANEOUS at 08:31

## 2022-01-25 RX ADMIN — FOLIC ACID 1 MG: 1 TABLET ORAL at 08:32

## 2022-01-25 NOTE — CASE MANAGEMENT
Cm received a voice message from [employer] at [facility] indicating a referral had been made by HOST and pt was required to complete a pt interview and a COVID result was needed  Cm met with pt and pt signed LUIS E for [facility] and requested pt interview be completed on her cell phone  CM contacted [employer] at [phone], admissions at [place], and discussed pt's possible placement  Alma German was provided with pt cell number and informed to contact pt at this number to complete pt interview  Cm faxed Laura results to [phone]

## 2022-01-25 NOTE — ASSESSMENT & PLAN NOTE
· Patient reports to drinking 1 5L of wine daily  · Last drank:  1/23/22 around 1:30pm   · Ethanol level upon arrival for ED: 107  · Discontinue SEWS protocol  · Patient did not receive any phenobarbital as patient experienced a mild withdrawal   · Patient denies any nausea, vomiting, tremors, diarrhea, chills, or sweats  Patient endorses mild anxiety     · Continue oral folic acid, thiamine and multivitamin supplementation   · Patient is medically stable from withdrawal perspective

## 2022-01-25 NOTE — ASSESSMENT & PLAN NOTE
· CBC 1/25/2022 revealed stable hgb of 13 2 with   · Likely 2/2 chronic alcohol use  · Encourage ongoing cessation   · Continue daily thiamine and folic acid supplementation  · Recommend outpatient f/u with PCP for routine monitoring

## 2022-01-25 NOTE — PROGRESS NOTES
Progress Note - Medical Toxicology    Zackary Mao 73 y o  female MRN: [id number]  Unit/Bed#: 5T DETOX 509-01 Encounter: [id number]  MEDICAL DETOX UNIT, LEVEL 4  Department of Medical Toxicology  Reason for Admission/Principal Problem: Rounding Provider: Daniel Vuong PA-C, Tara Gonzalez MD         * Alcohol withdrawal syndrome with complication, with unspecified complication [facility])  Assessment & Plan  · Patient reports to drinking 1 5L of wine daily  · Last drank:  1/23/22 around 1:30pm   · Ethanol level upon arrival for ED: 107  · Discontinue SEWS protocol  · Patient did not receive any phenobarbital as patient experienced a mild withdrawal   · Patient denies any nausea, vomiting, tremors, diarrhea, chills, or sweats  Patient endorses mild anxiety     · Continue oral folic acid, thiamine and multivitamin supplementation   · Patient is medically stable from withdrawal perspective       Alcohol use disorder, moderate, dependence ([facility] [address] )  Assessment & Plan  · Patient endorses longstanding history of alcohol abuse  · Patient denies any prior inpatient hospitalizations for detox without history of alcohol withdrawal seizures  · Case Management consulted for aftercare planning  · At this time patient wishes for inpatient rehab placement upon discharge     Elevated liver enzymes  Assessment & Plan  · Mild elevation of LFTS  · Most likely secondary to chronic alcohol use  · AST/ ALT: 82/47   · Encourage alcohol cessation     Macrocytosis without anemia  Assessment & Plan  ·   · HGB stable  · Patient received  IV folic acid and thiamine supplementation at this time will transition to oral folic acid and thiamine supplementation     Osteoarthritis of both knees  Assessment & Plan  · Tylenol as needed for pain  · Will order voltaren gel prn             VTE Pharmacologic Prophylaxis:   Pharmacologic: Enoxaparin (Lovenox)  Mechanical VTE Prophylaxis in Place: yes    Code Status: Level 1 - Full Code    Patient Centered Rounds: I have performed bedside rounds with nursing staff today  Discussions with Specialists or Other Care Team Provider: Case Management  Education and Discussions with Family / Patient: I personally did not discuss this patient with family     Time Spent for Care: 20 minutes  More than 50% of total time spent on counseling and coordination of care as described above  Current Length of Stay: 1 day(s)    Current Patient Status: Inpatient     Certification Statement: The patient will continue to require additional inpatient hospital stay due to pending placement  Discharge Plan: Discharge to inpatient rehab on 1/25/22      Subjective:   Patient seen and examined at bedside  Patient denies any active withdrawal symptoms at this time   She also denies any symptoms of chest pain, abdominal pain, or shortness of breath     Objective:     Clinical Opiate Withdrawal Scale  Pulse: 63  Resting Pulse Rate: Measured After Patient is Sitting or Lying for One Minute: Pulse rate 101-120  GI Upset: Over Last Half Hour: No GI symptoms  Sweating: Over Past Half Hour Not Accounted for by Room Temperature of Patient Activity: No report of chills or flushing  Tremor: Observation of Outstretched Hands: No tremor  Restlessness: Observation During Assessment: Able to sit still  Yawning: Observation During Assessment: No yawning  Pupil Size: Pupils pinned or normal size for room light  Anxiety and Irritability: None  Bone or Joint Aches: If Patient was Having Pain Previously, Only the Additional Component Attributed to Opiate Withdrawal is Scored: Not present  Gooseflesh Skin: Skin is smooth  Runny Nose or Tearing: Not Accounted for by Cold Symptoms or Allergies: Not present  Clinical Opiate Withdrawal Scale Total Score: 2    SEWS Total Score: 0 (1/25/2022  4:00 AM)        Last 24 Hours Medication List:   Current Facility-Administered Medications   Medication Dose Route Frequency Provider Last Rate    acetaminophen  650 mg Oral Q6H PRN Darren Mclaughline, PA-C      docusate sodium  100 mg Oral BID PRN Darren Summers, PA-BERNA      enoxaparin  40 mg Subcutaneous Daily Darren [facility], MARTINA      folic acid  1 mg Oral Daily Darren [place], [place]      melatonin  3 mg Oral HS PRN Darren Summers, PA-C      multivitamin-minerals  1 tablet Oral Daily [place], [place]      nicotine  21 mg Transdermal Daily Darren Summers, PA-C      ondansetron  4 mg Intravenous Q6H PRN Darren Summers, PA-C      thiamine  100 mg Oral Daily Darren Mclaughlin, PA-C           Vitals:   Temp (24hrs), Av 3 °F (36 8 °C), Min:97 8 °F (36 6 °C), Max:98 7 °F (37 1 °C)    Temp:  [97 8 °F (36 6 °C)-98 7 °F (37 1 °C)] 98 6 °F (37 °C)  HR:  [] 63  Resp:  [16-20] 18  BP: (108-144)/(61-99) 143/99  SpO2:  [94 %-97 %] 94 %  Body mass index is 22 45 kg/m²  Input and Output Summary (last 24 hours):No intake or output data in the 24 hours ending 22 8406    Physical Exam:   Physical Exam  Constitutional:       Appearance: She is not diaphoretic  Eyes:      Pupils: Pupils are equal, round, and reactive to light  Cardiovascular:      Rate and Rhythm: Normal rate and regular rhythm  Heart sounds: No murmur heard  Pulmonary:      Breath sounds: Normal breath sounds  Abdominal:      General: Bowel sounds are normal  There is no distension  Palpations: Abdomen is soft  Skin:     General: Skin is warm and dry  Neurological:      Mental Status: She is oriented to person, place, and time           Additional Data:     Labs: keep all most recent labs as listed on admission templates   Results from last 7 days   Lab Units 22   WBC Thousand/uL 6 00   < > 9 70   HEMOGLOBIN g/dL 13 2   < > 14 3   HEMATOCRIT % 37 8   < > 41 3   PLATELETS Thousands/uL 181   < > 214   NEUTROS PCT %  --   --  62   LYMPHS PCT %  --   --  29   MONOS PCT %  --   --  7   EOS PCT %  --   --  1    < > = values in this interval not displayed  Results from last 7 days   Lab Units 01/25/22  0519   SODIUM mmol/L 134*   POTASSIUM mmol/L 3 6   CHLORIDE mmol/L 103   CO2 mmol/L 25   BUN mg/dL 9   CREATININE mg/dL 0 71   ANION GAP mmol/L 6   CALCIUM mg/dL 8 4   ALBUMIN g/dL 3 1   TOTAL BILIRUBIN mg/dL 0 90   ALK PHOS U/L 107   ALT U/L 47*   AST U/L 82*   GLUCOSE RANDOM mg/dL 90                              * I Have Reviewed All Lab Data Listed Above  * Additional Pertinent Lab Tests Reviewed: Lennie 66 Admission Reviewed      Imaging Studies: I have personally reviewed pertinent reports  Recent Cultures (last 7 days): Today, Patient Was Seen By: Marco Mas PA-C    ** Please Note: Dictation voice to text software may have been used in the creation of this document   **

## 2022-01-25 NOTE — CASE MANAGEMENT
George met with pt who indicates she will be going to [facility] recovery center and was on the phone with them to complete intake  Pt states her and her  will be paying for this treatment, Pt signed an LUIS E for [facility] recovery  Cm contacted Yu Helton at [facility] recovery [phone] and faxed medical records to [phone]  [facility] once medical records are reviewed transportation would be arranged  George spoke with Mile Castillo at [facility] and informed of this plan

## 2022-01-25 NOTE — CASE MANAGEMENT
Cm coordinated with Yu Helton at [facility] for placement of pt tomorrow morning at their facility  Pt was in agreement with this plan  Pt states her  will be providing her with clothing to bring with her tomorrow

## 2022-01-25 NOTE — PLAN OF CARE
Problem: SUBSTANCE USE/ABUSE  Goal: By discharge, will develop insight into their chemical dependency and sustain motivation to continue in recovery  Description: INTERVENTIONS:  - Attends all daily group sessions and scheduled AA groups  - Actively practices coping skills through participation in the therapeutic community and adherence to program rules  - Reviews and completes assignments from individual treatment plan  - Assist patient development of understanding of their personal cycle of addiction and relapse triggers  Outcome: Progressing  Goal: By discharge, patient will have ongoing treatment plan addressing chemical dependency  Description: INTERVENTIONS:  - Assist patient with resources and/or appointments for ongoing recovery based living  Outcome: Progressing     Problem: Potential for Falls  Goal: Patient will remain free of falls  Description: INTERVENTIONS:  - Educate patient/family on patient safety including physical limitations  - Instruct patient to call for assistance with activity   - Consult OT/PT to assist with strengthening/mobility   - Keep Call bell within reach  - Keep bed low and locked with side rails adjusted as appropriate  - Keep care items and personal belongings within reach  - Initiate and maintain comfort rounds  - Make Fall Risk Sign visible to staff  - Offer Toileting every 2 Hours, in advance of need  - Apply yellow socks and bracelet for high fall risk patients  - Consider moving patient to room near nurses station  Outcome: Progressing     Problem: Nutrition/Hydration-ADULT  Goal: Nutrient/Hydration intake appropriate for improving, restoring or maintaining nutritional needs  Description: Monitor and assess patient's nutrition/hydration status for malnutrition  Collaborate with interdisciplinary team and initiate plan and interventions as ordered  Monitor patient's weight and dietary intake as ordered or per policy   Utilize nutrition screening tool and intervene as necessary  Determine patient's food preferences and provide high-protein, high-caloric foods as appropriate       INTERVENTIONS:  - Monitor oral intake, urinary output, labs, and treatment plans  - Assess nutrition and hydration status and recommend course of action  - Evaluate amount of meals eaten  - Assist patient with eating if necessary   - Allow adequate time for meals  - Recommend/ encourage appropriate diets, oral nutritional supplements, and vitamin/mineral supplements  - Order, calculate, and assess calorie counts as needed  - Recommend, monitor, and adjust tube feedings and TPN/PPN based on assessed needs  - Assess need for intravenous fluids  - Provide specific nutrition/hydration education as appropriate  - Include patient/family/caregiver in decisions related to nutrition  Outcome: Progressing

## 2022-01-25 NOTE — ASSESSMENT & PLAN NOTE
· Mild elevation of LFTs  · AST/ ALT: 82/47   · likely 2/2 chronic alcohol use  · Encourage ongoing alcohol cessation   · Recommend outpatient f/u with PCP for routine monitoring

## 2022-01-25 NOTE — ASSESSMENT & PLAN NOTE
· Mild elevation of LFTS  · Most likely secondary to chronic alcohol use  · AST/ ALT: 82/47   · Encourage alcohol cessation

## 2022-01-25 NOTE — CASE MANAGEMENT
Cm received a call from Narda Clark at  [place] [address] indicating pt had contacted her stating she was not interested in entering [facility]  Narda Clark stated that [facility] had been contacted again regarding possible placement for pt and [facility] had indicated that pt could not be accepted for at least 5 days due to her need for continued detox  Cm met with pt and pt indicates she was not receptive to [place] due to the living conditions explained to her  Pt requested cm reach out to [facility] to discuss her possible placement  Pt signed an LUIS E for [facility] and they were contacted at [phone]  Cm spoke with supervisor Mora who indicates that pt could not be accepted due to the need for further detox and acceptance to rehab could only occur after 2 more days of pt receiving detox management  Cm then contacted and spoke with Oscar Sherwood at [facility] and discussed other possible placements  Cm was informed due to limitations with [employer], pt could only be referred to specific locations  Oscar Sherwood indicates a referral is still pending with [address] and she will inform cm if this is a possible placement after follow up with [place]

## 2022-01-25 NOTE — DISCHARGE SUMMARY
[facility]  Discharge- Jameel Anderson 1950, 73 y o  female MRN: [id number]  Unit/Bed#: 5T DETOX 509-01 Encounter: [id number]  Primary Care Provider: No primary care provider on file  Date and time admitted to hospital: 1/24/2022 10:22 AM    MEDICAL DETOX UNIT, LEVEL 4  Department of Medical Toxicology  Reason for Admission/Principal Problem: alcohol withdrawal, alcohol use disorder  Admitting provider: MARTINA Serrano MD   1/24/2022 10:22 AM       Discharging Physician / Practitioner: Pily Funes PA-C  PCP: No primary care provider on file    Admission Date:   Admission Orders (From admission, onward)     Ordered        01/24/22 1042  Inpatient Admission  Once                      Discharge Date: 01/26/22    Medical Problems             Resolved Problems  Never Reviewed          Resolved    * (Principal) Alcohol withdrawal syndrome with complication, with unspecified complication ([facility] [address] ) 7/97/2292     Resolved by  [facility] MARTINA Mcmillan                * Alcohol withdrawal syndrome with complication, with unspecified complication (HCC)-resolved as of 1/25/2022  Assessment & Plan  · Daily alcohol consumption PTA, no h/o withdrawal seizures  · Last drank:  1/23/22 around 1:30pm   · Ethanol level 1/23/2022: 107  · SEWS protocol discontinued 1/25/2022- patient did not require any phenobarbital during treatment   · Currently stable- no evidence of ongoing withdrawal at this time    Alcohol use disorder, moderate, dependence ([facility] [address] )  Assessment & Plan  · Patient endorses longstanding history of alcohol abuse  · Patient reports drinking 1 5L of wine daily  · Patient denies any prior inpatient hospitalizations for detox without history of alcohol withdrawal seizures  · Continue daily thiamine/folic acid supplementation  · Case Management consulted during admission- patient to be discharged to [facility] inpatient     Macrocytosis without anemia  Assessment & Plan  · CBC 1/25/2022 revealed stable hgb of 13 2 with   · Likely 2/2 chronic alcohol use  · Encourage ongoing cessation   · Continue daily thiamine and folic acid supplementation  · Recommend outpatient f/u with PCP for routine monitoring  Elevated liver enzymes  Assessment & Plan  · Mild elevation of LFTs  · AST/ ALT: 82/47   · likely 2/2 chronic alcohol use  · Encourage ongoing alcohol cessation   · Recommend outpatient f/u with PCP for routine monitoring  Osteoarthritis of both knees  Assessment & Plan  · Continue Tylenol as needed for pain    Consultations During Hospital Stay:  · Case management     Procedures Performed:   · None    Significant Findings / Test Results:   · Elevated LFTs  · Macrocytic anemia    Incidental Findings:   · none     Test Results Pending at Discharge (will require follow up):   · none     Outpatient Tests Requested:  · None    Complications:  none    Reason for Admission: alcohol withdrawal, alcohol use disorder     Hospital Course:     Vera Waggoner is a 73 y o  female patient with a past medical history of AUD, hx of breast cancer status post bilateral mastectomy who originally presented to the hospital on 1/24/2022 due to acute alcohol withdrawal  Patient originally presented to [facility] ED for detox eval and was subsequently transferred to [facility] detox unit for further treatment  SEWS protocol was initiated  Patient did not receive any phenobarbital during her hospitalization as her withdrawal symptoms were mild and patient remained stable during monitoring off SEWS  Also received IV fluids, folic acid, and thiamine supplementation during admission  Morning labs were monitored daily with improvement and stabilization of electrolytes  Macrocytic anemia noted, likely due to myelosuppression from chronic alcohol use  Case management was consulted during admission, patient to be discharged to inpatient rehab at [facility]      Please see above list of diagnoses and related plan for additional information  Condition at Discharge: stable     Discharge Day Visit / Exam:     Subjective:  Patient was seen and examined at bedside  Patient denies all symptoms of withdrawal  She is appreciative of the care that she has received on the unit and is looking forward to continue treatment at [facility]  States she is motivated to continue making progress  States she slept well, reports adequate appetite and hydration  Denies headache, fevers/chills, lightheadedness/dizziness, chest pain, SOB, abdominal pain, N/V/D/C, dysuria, hematuria  Vitals: Blood Pressure: 133/71 (01/26/22 0700)  Pulse: 71 (01/26/22 0700)  Temperature: 97 9 °F (36 6 °C) (01/26/22 0700)  Temp Source: Temporal (01/26/22 0700)  Respirations: 18 (01/26/22 0700)  Height: 5' 6" (167 6 cm) (01/24/22 1024)  Weight - Scale: 63 1 kg (139 lb 1 8 oz) (01/24/22 1024)  SpO2: 95 % (01/26/22 0700)    Exam:   Physical Exam  Vitals and nursing note reviewed  Constitutional:       General: She is not in acute distress  Appearance: Normal appearance  She is well-developed and normal weight  She is not ill-appearing or diaphoretic  HENT:      Head: Normocephalic and atraumatic  Mouth/Throat:      Mouth: Mucous membranes are moist       Pharynx: Oropharynx is clear  Eyes:      General: No scleral icterus  Extraocular Movements: Extraocular movements intact  Conjunctiva/sclera: Conjunctivae normal       Pupils: Pupils are equal, round, and reactive to light  Cardiovascular:      Rate and Rhythm: Normal rate and regular rhythm  Pulses: Normal pulses  Heart sounds: Normal heart sounds  No murmur heard  No friction rub  No gallop  Pulmonary:      Effort: Pulmonary effort is normal  No respiratory distress  Breath sounds: Normal breath sounds  No wheezing, rhonchi or rales  Abdominal:      General: Abdomen is flat  Bowel sounds are normal  There is no distension        Palpations: Abdomen is soft       Tenderness: There is no abdominal tenderness  There is no guarding  Musculoskeletal:         General: Normal range of motion  Cervical back: Normal range of motion and neck supple  Right lower leg: No edema  Left lower leg: No edema  Skin:     General: Skin is warm and dry  Neurological:      General: No focal deficit present  Mental Status: She is alert and oriented to person, place, and time  Sensory: No sensory deficit  Psychiatric:         Attention and Perception: Attention normal          Mood and Affect: Mood normal          Speech: Speech normal          Behavior: Behavior is cooperative  Discussion with Family: I personally did not discuss this patient with her family  Discussed plan with patient and answered all questions to best of my ability  Discharge instructions/Information to patient and family:   See after visit summary for information provided to patient and family  Provisions for Follow-Up Care:  See after visit summary for information related to follow-up care and any pertinent home health orders  Disposition:     Home- inpatient rehab at [facility] to [facility] SNF:   · Not Applicable to this Patient - Not Applicable to this Patient    Planned Readmission: none     Discharge Statement:  I spent 35 minutes discharging the patient  This time was spent on the day of discharge  I had direct contact with the patient on the day of discharge  Greater than 50% of the total time was spent examining patient, answering all patient questions, arranging and discussing plan of care with patient as well as directly providing post-discharge instructions  Additional time then spent on discharge activities  Discharge Medications:  See after visit summary for reconciled discharge medications provided to patient and family        ** Please Note: This note has been constructed using a voice recognition system **

## 2022-01-25 NOTE — ASSESSMENT & PLAN NOTE
·   · HGB stable  · Patient received  IV folic acid and thiamine supplementation at this time will transition to oral folic acid and thiamine supplementation

## 2022-01-25 NOTE — ASSESSMENT & PLAN NOTE
· Daily alcohol consumption PTA, no h/o withdrawal seizures  · Last drank:  1/23/22 around 1:30pm   · Ethanol level 1/23/2022: 107  · SEWS protocol discontinued 1/25/2022- patient did not require any phenobarbital during treatment   · Currently stable- no evidence of ongoing withdrawal at this time

## 2022-01-25 NOTE — CASE MANAGEMENT
[facility] requesting updated medical records to begin search for bed for pt for admission to inpatient substance use treatment today  George then met with pt and reviewed this process  Pt maintained she wanted to enter inpatient treatment

## 2022-01-25 NOTE — ASSESSMENT & PLAN NOTE
· Patient endorses longstanding history of alcohol abuse  · Patient reports drinking 1 5L of wine daily  · Patient denies any prior inpatient hospitalizations for detox without history of alcohol withdrawal seizures  · Continue daily thiamine/folic acid supplementation  · Case Management consulted during admission- patient to be discharged to [facility] inpatient

## 2022-01-26 VITALS
DIASTOLIC BLOOD PRESSURE: 71 MMHG | OXYGEN SATURATION: 95 % | HEIGHT: 66 IN | TEMPERATURE: 97.9 F | RESPIRATION RATE: 18 BRPM | HEART RATE: 71 BPM | SYSTOLIC BLOOD PRESSURE: 133 MMHG | BODY MASS INDEX: 22.36 KG/M2 | WEIGHT: 139.11 LBS

## 2022-01-26 PROCEDURE — 99239 HOSP IP/OBS DSCHRG MGMT >30: CPT | Performed by: PHYSICIAN ASSISTANT

## 2022-01-26 PROCEDURE — NC001 PR NO CHARGE: Performed by: PHYSICIAN ASSISTANT

## 2022-01-26 RX ORDER — FOLIC ACID 1 MG/1
1 TABLET ORAL DAILY
Refills: 0
Start: 2022-01-27

## 2022-01-26 RX ORDER — LANOLIN ALCOHOL/MO/W.PET/CERES
100 CREAM (GRAM) TOPICAL DAILY
Refills: 0
Start: 2022-01-27

## 2022-01-26 RX ADMIN — THIAMINE HCL TAB 100 MG 100 MG: 100 TAB at 08:01

## 2022-01-26 RX ADMIN — MULTIPLE VITAMINS W/ MINERALS TAB 1 TABLET: TAB ORAL at 08:01

## 2022-01-26 RX ADMIN — FOLIC ACID 1 MG: 1 TABLET ORAL at 08:01

## 2022-01-26 NOTE — NURSING NOTE
Patient IV had already been removed  Patient AVS reviewed and copy completed for receiving facility  Patient had no questions regarding DC  Patient confirmed she has all belongings

## 2022-01-26 NOTE — DISCHARGE INSTRUCTIONS
Alcohol Use Disorder   WHAT YOU NEED TO KNOW:   Alcohol use disorder (AUD) is problem drinking  AUD includes alcohol abuse and alcohol dependence  Alcohol can damage your brain, heart, kidneys, lungs, and liver  Your risk of stroke is greater if you have 5 or more drinks each day  If you are pregnant, you and your baby are at risk for serious health problems  No amount of alcohol is safe during pregnancy  DISCHARGE INSTRUCTIONS:   Call your local emergency number (911 in the [address]) if:   · You have seizures  Call your doctor if:   · Your heart is beating faster than usual     · You have hallucinations  · You cannot remember what happens while you are drinking  · You are anxious and have nausea  · Your hands are shaky and you are sweating heavily  · You have questions or concerns about your condition or care  Follow up with your healthcare provider as directed:  Do not try to stop drinking on your own  Your healthcare provider may need to help you withdraw from alcohol safely  He or she may need to admit you to the hospital  You may also need any of the following:  · Medicines to decrease your craving for alcohol    · Support groups such as Alcoholics Anonymous     · Therapy from a psychiatrist or psychologist    · Admission to an inpatient facility for treatment for severe AUD    For support and more information:   · Substance Abuse and [address] , [address] [place]  Web Address: https://[employer]/    http://www erwin info/berny  Web Address: [address] Information is for End User's use only and may not be sold, redistributed or otherwise used for commercial purposes  All illustrations and images included in CareNotes® are the copyrighted property of A D A M , Inc  or Nabila Haney   The above information is an  only   It is not intended as medical advice for individual conditions or treatments  Talk to your doctor, nurse or pharmacist before following any medical regimen to see if it is safe and effective for you

## 2022-01-26 NOTE — PLAN OF CARE
Problem: SUBSTANCE USE/ABUSE  Goal: By discharge, will develop insight into their chemical dependency and sustain motivation to continue in recovery  Description: INTERVENTIONS:  - Attends all daily group sessions and scheduled AA groups  - Actively practices coping skills through participation in the therapeutic community and adherence to program rules  - Reviews and completes assignments from individual treatment plan  - Assist patient development of understanding of their personal cycle of addiction and relapse triggers  Outcome: Progressing  Goal: By discharge, patient will have ongoing treatment plan addressing chemical dependency  Description: INTERVENTIONS:  - Assist patient with resources and/or appointments for ongoing recovery based living  Outcome: Progressing     Problem: Potential for Falls  Goal: Patient will remain free of falls  Description: INTERVENTIONS:  - Educate patient/family on patient safety including physical limitations  - Instruct patient to call for assistance with activity   - Consult OT/PT to assist with strengthening/mobility   - Keep Call bell within reach  - Keep bed low and locked with side rails adjusted as appropriate  - Keep care items and personal belongings within reach  - Initiate and maintain comfort rounds  - Make Fall Risk Sign visible to staff  - Apply yellow socks and bracelet for high fall risk patients  - Consider moving patient to room near nurses station  Outcome: Progressing     Problem: Nutrition/Hydration-ADULT  Goal: Nutrient/Hydration intake appropriate for improving, restoring or maintaining nutritional needs  Description: Monitor and assess patient's nutrition/hydration status for malnutrition  Collaborate with interdisciplinary team and initiate plan and interventions as ordered  Monitor patient's weight and dietary intake as ordered or per policy  Utilize nutrition screening tool and intervene as necessary   Determine patient's food preferences and provide high-protein, high-caloric foods as appropriate       INTERVENTIONS:  - Monitor oral intake, urinary output, labs, and treatment plans  - Assess nutrition and hydration status and recommend course of action  - Evaluate amount of meals eaten  - Assist patient with eating if necessary   - Allow adequate time for meals  - Recommend/ encourage appropriate diets, oral nutritional supplements, and vitamin/mineral supplements  - Order, calculate, and assess calorie counts as needed  - Recommend, monitor, and adjust tube feedings and TPN/PPN based on assessed needs  - Assess need for intravenous fluids  - Provide specific nutrition/hydration education as appropriate  - Include patient/family/caregiver in decisions related to nutrition  Outcome: Progressing

## 2022-01-26 NOTE — CASE MANAGEMENT
Case Management Discharge Planning Note    Patient name Sharene Hidden  Location 5T DETOX 509/5T DETOX 50* MRN [id number]  : 1950 Date 2022       Current Admission Date: 2022  Current Admission Diagnosis:Alcohol use disorder, moderate, dependence ([facility] [address] )   Patient Active Problem List    Diagnosis Date Noted    Elevated liver enzymes 2022    Alcohol use disorder, moderate, dependence ([facility] [address] ) 2022    Osteoarthritis of both knees 2022    Macrocytosis without anemia 2022      LOS (days): 2  Geometric Mean LOS (GMLOS) (days):   Days to GMLOS:     OBJECTIVE:  Risk of Unplanned Readmission Score: 7         Current admission status: Inpatient   Preferred Pharmacy:   [address]  [address]  [address]  Phone: [phone] Fax: [phone]    Primary Care Provider: No primary care provider on file  Primary Insurance: MEDICARE  Secondary Insurance: [facility]    DISCHARGE DETAILS: Cm met with pt and pt signed discharge IMM  Pt states she is excited to enter [facility] recovery today  Cm contacted pt's Sherine vogt, and discussed this transfer  Pt will be discharged to inpatient substance use treatment today  Discharge planning discussed with[de-identified] patient  Freedom of Choice: Yes                   Contacts  Patient Contacts: Sherine Shaw  Relationship to Patient[de-identified] Family  Contact Method: Phone  Phone Number: [phone]  Reason/Outcome: Emergency Contact              Other Referral/Resources/Interventions Provided:  Referrals Provided[de-identified] IOP,Support Group,Therapist,Crisis Hotline  Post Acute Placement to[de-identified] Substance Abuse Treatment    Would you like to participate in our [address] service program?  : No - Declined          Transport at Discharge : Auto with designated   Dispatcher Contacted: No     Transported by [employer] and Unit #):  [facility] transport           Transfer Mode: Self  Accompanied by: Alone     IMM Given (Date):: 01/26/22  IMM Given to[de-identified] Patient

## 2023-08-31 ENCOUNTER — PREP FOR PROCEDURE (OUTPATIENT)
Age: 73
End: 2023-08-31

## 2023-08-31 DIAGNOSIS — Z85.3 PERSONAL HISTORY OF MALIGNANT NEOPLASM OF BREAST: ICD-10-CM

## 2023-08-31 DIAGNOSIS — N65.0 DEFORMITY OF RECONSTRUCTED BREAST: Primary | ICD-10-CM

## 2023-09-27 PROBLEM — T85.44XA BREAST IMPLANT CAPSULAR CONTRACTURE: Status: ACTIVE | Noted: 2023-09-27

## 2023-10-02 RX ORDER — AMOXICILLIN 500 MG
CAPSULE ORAL
COMMUNITY

## 2023-10-02 RX ORDER — PRAVASTATIN SODIUM 40 MG
40 TABLET ORAL
COMMUNITY

## 2023-10-02 RX ORDER — MULTIVITAMIN
1 CAPSULE ORAL DAILY
COMMUNITY

## 2023-10-02 RX ORDER — MULTIVIT-MIN/IRON/FOLIC ACID/K 18-600-40
CAPSULE ORAL
COMMUNITY

## 2023-10-02 RX ORDER — GLUCOSAMINE/D3/BOSWELLIA SERRA 1500MG-400
TABLET ORAL
COMMUNITY

## 2023-10-02 RX ORDER — MULTIVIT WITH MINERALS/LUTEIN
1000 TABLET ORAL 2 TIMES DAILY
COMMUNITY

## 2023-10-02 NOTE — PRE-PROCEDURE INSTRUCTIONS
Pre-Surgery Instructions:   Medication Instructions   • Ascorbic Acid (vitamin C) 1000 MG tablet Instructions provided by MD   • Biotin 87081 MCG TABS Instructions provided by MD   • Cholecalciferol (Vitamin D) 50 MCG (2000 UT) CAPS Instructions provided by MD   • Multiple Vitamin (multivitamin) capsule Instructions provided by MD   • Omega-3 Fatty Acids (Fish Oil) 1200 MG CAPS Instructions provided by MD   • pravastatin (PRAVACHOL) 40 mg tablet Take night before surgery   • vitamin B-12 (CYANOCOBALAMIN) 250 MCG tablet Instructions provided by MD    Medication instructions for day surgery reviewed. Please use only a sip of water to take your instructed medications. Avoid all over the counter vitamins, supplements and NSAIDS for one week prior to surgery per anesthesia guidelines. Tylenol is ok to take as needed. You will receive a call one business day prior to surgery with an arrival time and hospital directions. If your surgery is scheduled on a Monday, the hospital will be calling you on the Friday prior to your surgery. If you have not heard from anyone by 8pm, please call the hospital supervisor through the hospital  at [phone]. [place] Quan [phone]). Do not eat or drink anything after midnight the night before your surgery, including candy, mints, lifesavers, or chewing gum. Do not drink alcohol 24hrs before your surgery. Try not to smoke at least 24hrs before your surgery. Follow the pre surgery showering instructions as listed in the [facility] Surgical Experience Booklet” or otherwise provided by your surgeon's office. Do not shave the surgical area 24 hours before surgery. Do not apply any lotions, creams, including makeup, cologne, deodorant, or perfumes after showering on the day of your surgery. No contact lenses, eye make-up, or artificial eyelashes. Remove nail polish, including gel polish, and any artificial, gel, or acrylic nails if possible.  Remove all jewelry including rings and body piercing jewelry. Wear causal clothing that is easy to take on and off. Consider your type of surgery. Keep any valuables, jewelry, piercings at home. Please bring any specially ordered equipment (sling, braces) if indicated. Arrange for a responsible person to drive you to and from the hospital on the day of your surgery. Visitor Guidelines discussed. Call the surgeon's office with any new illnesses, exposures, or additional questions prior to surgery. Please reference your [facility] Surgical Experience Booklet” for additional information to prepare for your upcoming surgery. Per pt she received vitamin hold instructions from surgeons office. Pt verbalized understanding of shower and med instructions. Pt instructed to stop nsaids prior to surgery.

## 2023-10-03 ENCOUNTER — ANESTHESIA EVENT (OUTPATIENT)
Dept: PERIOP | Facility: HOSPITAL | Age: 73
End: 2023-10-03
Payer: MEDICARE

## 2023-10-03 NOTE — ANESTHESIA PREPROCEDURE EVALUATION
Procedure:  CAPSULECTOMY (Bilateral: Breast)  IMPLANT RECONSTRUCTION (Bilateral: Breast)  REMOVAL OF IMPLANT MATERIAL (Bilateral: Breast)    Relevant Problems   MUSCULOSKELETAL   (+) Osteoarthritis of both knees      Other   (+) Alcohol use disorder, moderate, dependence (HCC)   (+) Breast implant capsular contracture   (+) Elevated liver enzymes   (+) Macrocytosis without anemia        Physical Exam    Airway    Mallampati score: II  TM Distance: >3 FB  Neck ROM: full     Dental       Cardiovascular  Cardiovascular exam normal    Pulmonary  Pulmonary exam normal     Other Findings        Anesthesia Plan  ASA Score- 3     Anesthesia Type- general with ASA Monitors. Additional Monitors:   Airway Plan: ETT. Plan Factors-Exercise tolerance (METS): >4 METS. Chart reviewed. EKG reviewed. Existing labs reviewed. Patient summary reviewed. Patient is a current smoker. Patient instructed to abstain from smoking on day of procedure. Patient did not smoke on day of surgery. Induction- intravenous. Postoperative Plan- Plan for postoperative opioid use. Informed Consent- Anesthetic plan and risks discussed with patient. I personally reviewed this patient with the CRNA. Discussed and agreed on the Anesthesia Plan with the CRNA. Clare Wooten           No results found for: "HGBA1C"    Lab Results   Component Value Date    K 3.6 01/25/2022     01/25/2022    CO2 25 01/25/2022    BUN 9 01/25/2022    CREATININE 0.71 01/25/2022    CALCIUM 8.4 01/25/2022    CORRECTEDCA 9.1 01/25/2022    AST 82 (H) 01/25/2022    ALT 47 (H) 01/25/2022    ALKPHOS 107 01/25/2022    EGFR 87 01/25/2022       Lab Results   Component Value Date    WBC 6.00 01/25/2022    HGB 13.2 01/25/2022    HCT 37.8 01/25/2022     (H) 01/25/2022     01/25/2022   Sinus rhythm with 1st degree A-V block  Otherwise normal ECG  No previous ECGs available  Confirmed by Luba Bird ([id number]) on 1/24/2022 8:41:42 AM    AST/ALT from aug of last year reviewed

## 2023-10-04 ENCOUNTER — ANESTHESIA (OUTPATIENT)
Dept: PERIOP | Facility: HOSPITAL | Age: 73
End: 2023-10-04
Payer: MEDICARE

## 2023-10-04 ENCOUNTER — HOSPITAL ENCOUNTER (OUTPATIENT)
Facility: HOSPITAL | Age: 73
Setting detail: OUTPATIENT SURGERY
Discharge: HOME | End: 2023-10-04
Attending: SURGERY | Admitting: SURGERY
Payer: MEDICARE

## 2023-10-04 VITALS
HEART RATE: 79 BPM | SYSTOLIC BLOOD PRESSURE: 115 MMHG | OXYGEN SATURATION: 100 % | WEIGHT: 135 LBS | TEMPERATURE: 97.1 F | HEIGHT: 65 IN | DIASTOLIC BLOOD PRESSURE: 85 MMHG | RESPIRATION RATE: 16 BRPM | BODY MASS INDEX: 22.49 KG/M2

## 2023-10-04 DIAGNOSIS — N65.0 DEFORMITY OF RECONSTRUCTED BREAST: ICD-10-CM

## 2023-10-04 DIAGNOSIS — Z85.3 PERSONAL HISTORY OF MALIGNANT NEOPLASM OF BREAST: ICD-10-CM

## 2023-10-04 PROCEDURE — 88302 TISSUE EXAM BY PATHOLOGIST: CPT | Performed by: PATHOLOGY

## 2023-10-04 PROCEDURE — L8600 IMPLANT BREAST SILICONE/EQ: HCPCS | Performed by: SURGERY

## 2023-10-04 DEVICE — SMOOTH ROUND HIGH PROFILE GEL-FILLED BREAST IMPLANT, 450CC  SMOOTH ROUND SILICONE
Type: IMPLANTABLE DEVICE | Site: BREAST | Status: FUNCTIONAL
Brand: MENTOR MEMORYGEL BREAST IMPLANT

## 2023-10-04 RX ORDER — GLYCOPYRROLATE 0.2 MG/ML
INJECTION INTRAMUSCULAR; INTRAVENOUS AS NEEDED
Status: DISCONTINUED | OUTPATIENT
Start: 2023-10-04 | End: 2023-10-04

## 2023-10-04 RX ORDER — HYDROCODONE BITARTRATE AND ACETAMINOPHEN 5; 325 MG/1; MG/1
2 TABLET ORAL EVERY 4 HOURS PRN
Status: DISCONTINUED | OUTPATIENT
Start: 2023-10-04 | End: 2023-10-04 | Stop reason: HOSPADM

## 2023-10-04 RX ORDER — MIDAZOLAM HYDROCHLORIDE 2 MG/2ML
INJECTION, SOLUTION INTRAMUSCULAR; INTRAVENOUS AS NEEDED
Status: DISCONTINUED | OUTPATIENT
Start: 2023-10-04 | End: 2023-10-04

## 2023-10-04 RX ORDER — NEOSTIGMINE METHYLSULFATE 1 MG/ML
INJECTION INTRAVENOUS AS NEEDED
Status: DISCONTINUED | OUTPATIENT
Start: 2023-10-04 | End: 2023-10-04

## 2023-10-04 RX ORDER — DEXAMETHASONE SODIUM PHOSPHATE 10 MG/ML
INJECTION, SOLUTION INTRAMUSCULAR; INTRAVENOUS AS NEEDED
Status: DISCONTINUED | OUTPATIENT
Start: 2023-10-04 | End: 2023-10-04

## 2023-10-04 RX ORDER — EPHEDRINE SULFATE 50 MG/ML
INJECTION INTRAVENOUS AS NEEDED
Status: DISCONTINUED | OUTPATIENT
Start: 2023-10-04 | End: 2023-10-04

## 2023-10-04 RX ORDER — CEFAZOLIN SODIUM 2 G/50ML
2000 SOLUTION INTRAVENOUS ONCE
Status: COMPLETED | OUTPATIENT
Start: 2023-10-04 | End: 2023-10-04

## 2023-10-04 RX ORDER — ONDANSETRON 2 MG/ML
4 INJECTION INTRAMUSCULAR; INTRAVENOUS ONCE AS NEEDED
Status: DISCONTINUED | OUTPATIENT
Start: 2023-10-04 | End: 2023-10-04 | Stop reason: HOSPADM

## 2023-10-04 RX ORDER — PROPOFOL 10 MG/ML
INJECTION, EMULSION INTRAVENOUS AS NEEDED
Status: DISCONTINUED | OUTPATIENT
Start: 2023-10-04 | End: 2023-10-04

## 2023-10-04 RX ORDER — ROCURONIUM BROMIDE 10 MG/ML
INJECTION, SOLUTION INTRAVENOUS AS NEEDED
Status: DISCONTINUED | OUTPATIENT
Start: 2023-10-04 | End: 2023-10-04

## 2023-10-04 RX ORDER — KETAMINE HCL IN NACL, ISO-OSM 100MG/10ML
SYRINGE (ML) INJECTION AS NEEDED
Status: DISCONTINUED | OUTPATIENT
Start: 2023-10-04 | End: 2023-10-04

## 2023-10-04 RX ORDER — HYDROCODONE BITARTRATE AND ACETAMINOPHEN 5; 325 MG/1; MG/1
1 TABLET ORAL EVERY 4 HOURS PRN
Status: DISCONTINUED | OUTPATIENT
Start: 2023-10-04 | End: 2023-10-04 | Stop reason: HOSPADM

## 2023-10-04 RX ORDER — DIPHENHYDRAMINE HYDROCHLORIDE 50 MG/ML
12.5 INJECTION INTRAMUSCULAR; INTRAVENOUS ONCE AS NEEDED
Status: DISCONTINUED | OUTPATIENT
Start: 2023-10-04 | End: 2023-10-04 | Stop reason: HOSPADM

## 2023-10-04 RX ORDER — SODIUM CHLORIDE, SODIUM LACTATE, POTASSIUM CHLORIDE, CALCIUM CHLORIDE 600; 310; 30; 20 MG/100ML; MG/100ML; MG/100ML; MG/100ML
INJECTION, SOLUTION INTRAVENOUS CONTINUOUS PRN
Status: DISCONTINUED | OUTPATIENT
Start: 2023-10-04 | End: 2023-10-04

## 2023-10-04 RX ORDER — SODIUM CHLORIDE, SODIUM LACTATE, POTASSIUM CHLORIDE, CALCIUM CHLORIDE 600; 310; 30; 20 MG/100ML; MG/100ML; MG/100ML; MG/100ML
50 INJECTION, SOLUTION INTRAVENOUS CONTINUOUS
Status: DISCONTINUED | OUTPATIENT
Start: 2023-10-04 | End: 2023-10-04 | Stop reason: HOSPADM

## 2023-10-04 RX ORDER — MAGNESIUM HYDROXIDE 1200 MG/15ML
LIQUID ORAL AS NEEDED
Status: DISCONTINUED | OUTPATIENT
Start: 2023-10-04 | End: 2023-10-04 | Stop reason: HOSPADM

## 2023-10-04 RX ORDER — LIDOCAINE HYDROCHLORIDE 10 MG/ML
INJECTION, SOLUTION EPIDURAL; INFILTRATION; INTRACAUDAL; PERINEURAL AS NEEDED
Status: DISCONTINUED | OUTPATIENT
Start: 2023-10-04 | End: 2023-10-04

## 2023-10-04 RX ORDER — FENTANYL CITRATE/PF 50 MCG/ML
50 SYRINGE (ML) INJECTION
Status: DISCONTINUED | OUTPATIENT
Start: 2023-10-04 | End: 2023-10-04 | Stop reason: HOSPADM

## 2023-10-04 RX ORDER — FENTANYL CITRATE 50 UG/ML
INJECTION, SOLUTION INTRAMUSCULAR; INTRAVENOUS AS NEEDED
Status: DISCONTINUED | OUTPATIENT
Start: 2023-10-04 | End: 2023-10-04

## 2023-10-04 RX ORDER — KETOROLAC TROMETHAMINE 30 MG/ML
15 INJECTION, SOLUTION INTRAMUSCULAR; INTRAVENOUS ONCE
Status: DISCONTINUED | OUTPATIENT
Start: 2023-10-04 | End: 2023-10-04 | Stop reason: HOSPADM

## 2023-10-04 RX ORDER — ONDANSETRON 2 MG/ML
INJECTION INTRAMUSCULAR; INTRAVENOUS AS NEEDED
Status: DISCONTINUED | OUTPATIENT
Start: 2023-10-04 | End: 2023-10-04

## 2023-10-04 RX ORDER — SODIUM CHLORIDE, SODIUM LACTATE, POTASSIUM CHLORIDE, CALCIUM CHLORIDE 600; 310; 30; 20 MG/100ML; MG/100ML; MG/100ML; MG/100ML
100 INJECTION, SOLUTION INTRAVENOUS CONTINUOUS
Status: DISCONTINUED | OUTPATIENT
Start: 2023-10-04 | End: 2023-10-04 | Stop reason: HOSPADM

## 2023-10-04 RX ADMIN — ROCURONIUM BROMIDE 20 MG: 10 INJECTION, SOLUTION INTRAVENOUS at 08:36

## 2023-10-04 RX ADMIN — PROPOFOL 200 MG: 10 INJECTION, EMULSION INTRAVENOUS at 07:31

## 2023-10-04 RX ADMIN — ONDANSETRON 4 MG: 2 INJECTION INTRAMUSCULAR; INTRAVENOUS at 09:06

## 2023-10-04 RX ADMIN — EPHEDRINE SULFATE 10 MG: 50 INJECTION INTRAVENOUS at 07:55

## 2023-10-04 RX ADMIN — LIDOCAINE HYDROCHLORIDE 50 MG: 10 INJECTION, SOLUTION EPIDURAL; INFILTRATION; INTRACAUDAL; PERINEURAL at 07:31

## 2023-10-04 RX ADMIN — SODIUM CHLORIDE, SODIUM LACTATE, POTASSIUM CHLORIDE, AND CALCIUM CHLORIDE: .6; .31; .03; .02 INJECTION, SOLUTION INTRAVENOUS at 08:28

## 2023-10-04 RX ADMIN — Medication 20 MG: at 08:43

## 2023-10-04 RX ADMIN — CEFAZOLIN SODIUM 2000 MG: 2 SOLUTION INTRAVENOUS at 07:20

## 2023-10-04 RX ADMIN — NEOSTIGMINE METHYLSULFATE 4 MG: 1 INJECTION INTRAVENOUS at 09:06

## 2023-10-04 RX ADMIN — MIDAZOLAM 2 MG: 1 INJECTION INTRAMUSCULAR; INTRAVENOUS at 07:28

## 2023-10-04 RX ADMIN — GLYCOPYRROLATE 0.4 MG: 0.2 INJECTION, SOLUTION INTRAMUSCULAR; INTRAVENOUS at 09:06

## 2023-10-04 RX ADMIN — SODIUM CHLORIDE, SODIUM LACTATE, POTASSIUM CHLORIDE, AND CALCIUM CHLORIDE 50 ML/HR: .6; .31; .03; .02 INJECTION, SOLUTION INTRAVENOUS at 06:20

## 2023-10-04 RX ADMIN — SODIUM CHLORIDE, SODIUM LACTATE, POTASSIUM CHLORIDE, AND CALCIUM CHLORIDE: .6; .31; .03; .02 INJECTION, SOLUTION INTRAVENOUS at 06:18

## 2023-10-04 RX ADMIN — FENTANYL CITRATE 50 MCG: 50 INJECTION, SOLUTION INTRAMUSCULAR; INTRAVENOUS at 07:31

## 2023-10-04 RX ADMIN — DEXAMETHASONE SODIUM PHOSPHATE 10 MG: 10 INJECTION, SOLUTION INTRAMUSCULAR; INTRAVENOUS at 07:34

## 2023-10-04 RX ADMIN — Medication 30 MG: at 08:37

## 2023-10-04 RX ADMIN — FENTANYL CITRATE 50 MCG: 50 INJECTION, SOLUTION INTRAMUSCULAR; INTRAVENOUS at 07:37

## 2023-10-04 RX ADMIN — ROCURONIUM BROMIDE 30 MG: 10 INJECTION, SOLUTION INTRAVENOUS at 07:31

## 2023-10-04 NOTE — INTERVAL H&P NOTE
H&P reviewed. After examining the patient I find no changes in the patients condition since the H&P had been written.     Vitals:    10/04/23 0617   BP: 108/75   Pulse: 84   Resp: 18   Temp: 98.3 °F (36.8 °C)   SpO2: 96%

## 2023-10-04 NOTE — ANESTHESIA POSTPROCEDURE EVALUATION
Post-Op Assessment Note    CV Status:  Stable  Pain Score: 1    Pain management: adequate     Mental Status:  Alert and awake   Hydration Status:  Stable   PONV Controlled:  None   Airway Patency:  Patent      Post Op Vitals Reviewed: Yes      Staff: CRNA         No notable events documented.     /90 (10/04/23 0921)    Temp 98.4 °F (36.9 °C) (10/04/23 0921)    Pulse 99 (10/04/23 0921)   Resp 16 (10/04/23 0921)    SpO2 100 % (10/04/23 0921)

## 2023-10-04 NOTE — OP NOTE
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]     * Personal history of malignant neoplasm of breast [Z85.3]    Procedure(s):  Bilateral - CAPSULECTOMY  Bilateral - IMPLANT RECONSTRUCTION  Bilateral - REMOVAL OF IMPLANT MATERIAL    Specimen(s):  ID Type Source Tests Collected by Time Destination   1 : RIGHT BREAST SCAR AND CAPSULE Tissue Skin, Plastic Repair TISSUE EXAM Javier Richardson MD 10/4/2023 0745    2 : LEFT BREAST SCAR AND CAPSULE Tissue Skin, Plastic Repair TISSUE EXAM Javier Richardson MD 10/4/2023 0745        Estimated Blood Loss:   Minimal    Drains:  * No LDAs found *    Anesthesia Type:   General    Operative Indications:  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 Possible implant leak identified on imaging. Operative Findings:  Grade 4 contracture,  calcifications    Complications:   None    Procedure and Technique:  Patient defy correctly on the table. Intubated by anesthesia. Prepped and draped in sterile surgical fashion. Each breast was injected with 10 cc of quarter percent Marcaine with epinephrine. Incision was made excising out the old mastectomy scar. Each scar and wound capsule was sent off to pathology as separate specimen. At this point we dissected down onto the capsule we then dissected superiorly elevating the soft tissue up off of the underlying capsule and soft tissue. We then fired multiple areas of calcification and all of the calcified capsule was completely removed with electrocautery.   The old implant was removed on both sides both were found to be intact. There is no leakage of silicone. After he a capsulectomy was performed on both sides we really elevated pocket both superiorly medially. Meticulous hemostasis was obtained. We then went ahead and placed a 778 high-profile silicone round smooth implant by [place] into both pockets. We had good symmetry. Breasts are soft. We went ahead and closed the incision line with deep 2-0 Vicryl 2-0 PDS for the muscle layer. Deep dermis was closed with 3-0 PDS and running subcuticular 3-0 Monocryl stitch and Dermabond dressing. Same procedure was performed on both left and right side will only be dictated once. Patient was then awakened from surgery taken recovery in stable condition. All counts correct x2. I was present for the entire procedure.     Patient Disposition:  PACU         SIGNATURE: Xavier Guevara MD  DATE: October 4, 2023  TIME: 9:27 AM

## 2023-10-04 NOTE — DISCHARGE INSTR - AVS FIRST PAGE
[phone] MICAELA, [address] / Sofi Martin [phone] / [email or web address]     [place] Instructions for Augmentation Mammoplasty Patient     Please call the office today to schedule a post operative appointment, and tell the office staff  that you doctor needs see you in our office in 7-10 days. If there are drains they will be removed in 1-3 days. Your implants will sit high on your chest. It will take 3-6 months for the breast tissue to stretch and accommodate the implant. Someone should care for you the first 48 hours. You should relax at home; avoid bending, lifting, or other strenuous activities. Care should be taken with normal daily activities. If you have small children arrangements should be made for the . The breasts will may be black and blue and swollen to a varying degree for up to 3 weeks. You may shower the day after surgery unless instructed otherwise. You should make arrangements to be off from work at least one week following surgery. It is best to limit strenuous exercise, sports, and arm lifting for 1 month. Do not drive a car while you are on pain medications. You will be given a prescription for a pain medicine. You can use extra strength Tylenol, Advil or Aleve as a substitute    We have spent considerable time and effort to make your surgical experience as efficient and pleasant as possible. We would appreciate your suggestions regarding any area of your care, which you think, could be improved to make your experience more pleasant. If you have any questions, please call the office between 9:00 A. M. and 5:00 P. M.       If a problem should arise after hours, the doctor can be reached through the answering service at [phone]

## 2023-10-09 PROCEDURE — 88302 TISSUE EXAM BY PATHOLOGIST: CPT | Performed by: PATHOLOGY

## 2023-11-20 NOTE — DISCHARGE SUMMARY
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 Reason for Admission:  Noemí Davidson was electively admitted to undergo the above named procedure on 10/4/2023 as an outpatient. Hospital Course:  Patient underwent the above named procedure on the day of admission without complications. They were discharged home the same day. Disposition:  To home in care of self and family.   Condition:  Good  Follow up:  Patient with follow up in the office with Dr. Paul Banks MD in 1 week(s) or as scheduled per his office  Paul Banks MD  10/4/2023 7:02 AM Minocycline Counseling: Patient advised regarding possible photosensitivity and discoloration of the teeth, skin, lips, tongue and gums.  Patient instructed to avoid sunlight, if possible.  When exposed to sunlight, patients should wear protective clothing, sunglasses, and sunscreen.  The patient was instructed to call the office immediately if the following severe adverse effects occur:  hearing changes, easy bruising/bleeding, severe headache, or vision changes.  The patient verbalized understanding of the proper use and possible adverse effects of minocycline.  All of the patient's questions and concerns were addressed.

## 2024-10-03 LAB
ATRIAL RATE: 79 BPM
P AXIS: 72 DEGREES
PR INTERVAL: 210 MS
QRS AXIS: 4 DEGREES
QRSD INTERVAL: 74 MS
QT INTERVAL: 390 MS
QTC INTERVAL: 447 MS
T WAVE AXIS: 76 DEGREES
VENTRICULAR RATE: 79 BPM

## (undated) DEVICE — TUBING SUCTION 5MM X 12 FT

## (undated) DEVICE — NEEDLE BLUNT 18 G X 1 1/2IN

## (undated) DEVICE — SUT PDS II 3-0 SH 27 IN Z316H

## (undated) DEVICE — 1820 FOAM BLOCK NEEDLE COUNTER: Brand: DEVON

## (undated) DEVICE — BASIC SINGLE BASIN-LF: Brand: MEDLINE INDUSTRIES, INC.

## (undated) DEVICE — SUT VICRYL 2-0 CT-1 27 IN J259H

## (undated) DEVICE — SUT ETHILON 3-0 PS-1 18 IN 1663H

## (undated) DEVICE — SPONGE LAP 18 X 18 IN STRL RFD

## (undated) DEVICE — PREMIUM DRY TRAY LF: Brand: MEDLINE INDUSTRIES, INC.

## (undated) DEVICE — STANDARD SURGICAL GOWN, L: Brand: CONVERTORS

## (undated) DEVICE — SKIN MARKER DUAL TIP WITH RULER CAP, FLEXIBLE RULER AND LABELS: Brand: DEVON

## (undated) DEVICE — SUT MONOCRYL 3-0 PS-2 27 IN Y427H

## (undated) DEVICE — ELECTRODE BLADE MOD  E-Z CLEAN 6.5IN -0014M

## (undated) DEVICE — BASIC PACK: Brand: CONVERTORS

## (undated) DEVICE — SINGLE PORT MANIFOLD: Brand: NEPTUNE 2

## (undated) DEVICE — LIGHT GLOVE GREEN

## (undated) DEVICE — GLOVE SRG LF STRL BGL SKNSNS 6.5 PF

## (undated) DEVICE — POV-IOD SOLUTION 4OZ BT

## (undated) DEVICE — ADHESIVE SKIN HIGH VISCOSITY EXOFIN 1ML

## (undated) DEVICE — NEEDLE 25G X 1 1/2

## (undated) DEVICE — DISPOSABLE OR TOWEL: Brand: CARDINAL HEALTH

## (undated) DEVICE — PENCIL ELECTROSURG E-Z CLEAN -0035H

## (undated) DEVICE — SYRINGE 10ML LL

## (undated) DEVICE — INTENDED FOR TISSUE SEPARATION, AND OTHER PROCEDURES THAT REQUIRE A SHARP SURGICAL BLADE TO PUNCTURE OR CUT.: Brand: BARD-PARKER ® SAFETYLOCK CARBON RIB-BACK BLADES

## (undated) DEVICE — CHEST/BREAST DRAPE: Brand: CONVERTORS

## (undated) DEVICE — SUT MONOCRYL 4-0 P-3 18 IN Y494G

## (undated) DEVICE — STERILE POLYISOPRENE POWDER-FREE SURGICAL GLOVES: Brand: PROTEXIS

## (undated) DEVICE — SYRINGE 50ML LL

## (undated) DEVICE — ADHESIVE SKIN HIGH VISCOSITY EXOFIN PRECISION PEN

## (undated) DEVICE — SUT VICRYL 2-0 CT-1 36 IN J945H

## (undated) DEVICE — ELECTRODE BLADE MOD E-Z CLEAN 2.5IN 6.4CM -0012M

## (undated) DEVICE — BULB SYRINGE,IRRIGATION WITH PROTECTIVE CAP: Brand: DOVER

## (undated) DEVICE — SCD SEQUENTIAL COMPRESSION COMFORT SLEEVE MEDIUM KNEE LENGTH: Brand: KENDALL SCD

## (undated) DEVICE — SYRINGE 30ML LL